# Patient Record
Sex: FEMALE | Race: BLACK OR AFRICAN AMERICAN | Employment: OTHER | URBAN - METROPOLITAN AREA
[De-identification: names, ages, dates, MRNs, and addresses within clinical notes are randomized per-mention and may not be internally consistent; named-entity substitution may affect disease eponyms.]

---

## 2017-02-02 ENCOUNTER — HOSPITAL ENCOUNTER (OUTPATIENT)
Dept: CT IMAGING | Facility: HOSPITAL | Age: 62
Discharge: HOME | End: 2017-02-02
Attending: INTERNAL MEDICINE
Payer: MEDICAID

## 2017-02-02 DIAGNOSIS — R91.1 PULMONARY NODULE: ICD-10-CM

## 2017-02-02 PROCEDURE — 71250 CT THORAX DX C-: CPT

## 2017-02-14 ENCOUNTER — TELEPHONE (OUTPATIENT)
Dept: INTERNAL MEDICINE CLINIC | Facility: CLINIC | Age: 62
End: 2017-02-14

## 2017-02-14 DIAGNOSIS — M1A.00X0 IDIOPATHIC CHRONIC GOUT WITHOUT TOPHUS, UNSPECIFIED SITE: Primary | ICD-10-CM

## 2017-02-14 RX ORDER — COLCHICINE 0.6 MG/1
0.6 TABLET ORAL DAILY
Qty: 30 TABLET | Refills: 5 | Status: SHIPPED | OUTPATIENT
Start: 2017-02-14 | End: 2019-08-19

## 2017-02-14 NOTE — TELEPHONE ENCOUNTER
Pt called to inform L foot gout flare up. Pt stated does not want to take Prednisone again as had weight gain. Last time pt was seen for this Dx was on 11/21/16. Pt inquiring if there is a medication that can be taken chronically to prevent flare ups.  Anshu

## 2017-02-15 ENCOUNTER — TELEPHONE (OUTPATIENT)
Dept: INTERNAL MEDICINE CLINIC | Facility: CLINIC | Age: 62
End: 2017-02-15

## 2017-02-15 DIAGNOSIS — M1A.00X0 IDIOPATHIC CHRONIC GOUT WITHOUT TOPHUS, UNSPECIFIED SITE: Primary | ICD-10-CM

## 2017-02-15 NOTE — TELEPHONE ENCOUNTER
Yes, she can take colchicine for daily prevention of gout. I've sent a script to her pharmacy. Alessia Hernández. Isabel Nino MD  Diplomate, American Board of Internal Medicine  [facility]  [address] [facility],[address], [address], [facility], [address] [place] [address]  T: [phone]

## 2017-02-15 NOTE — TELEPHONE ENCOUNTER
Pt called to inform insurance will cover only 6 tabs per month of Colchicine. Pt inquiring if only take as needed? Or can get a different prescription to take daily. Please advise.

## 2017-02-16 RX ORDER — ALLOPURINOL 100 MG/1
100 TABLET ORAL DAILY
Qty: 30 TABLET | Refills: 5 | Status: SHIPPED | OUTPATIENT
Start: 2017-02-16 | End: 2017-04-26 | Stop reason: ALTCHOICE

## 2017-02-17 NOTE — TELEPHONE ENCOUNTER
We will do a change in the daily prevention medication. Script sent for Allopurinol 100 mg qAM.  For the colchicine, she can take it up to BID PRN for an acute gout flare. Rupinder Martines.  Severo Obey, MD  Diplomate, American Board of Internal Medicine  [facility]

## 2017-04-26 ENCOUNTER — HOSPITAL ENCOUNTER (OUTPATIENT)
Dept: GENERAL RADIOLOGY | Age: 62
Discharge: HOME | End: 2017-04-26
Attending: INTERNAL MEDICINE
Payer: MEDICAID

## 2017-04-26 ENCOUNTER — OFFICE VISIT (OUTPATIENT)
Dept: INTERNAL MEDICINE CLINIC | Facility: CLINIC | Age: 62
End: 2017-04-26

## 2017-04-26 VITALS
SYSTOLIC BLOOD PRESSURE: 106 MMHG | DIASTOLIC BLOOD PRESSURE: 82 MMHG | RESPIRATION RATE: 22 BRPM | HEART RATE: 88 BPM | OXYGEN SATURATION: 98 % | TEMPERATURE: 98 F | WEIGHT: 157 LBS | HEIGHT: 63.75 IN | BODY MASS INDEX: 27.13 KG/M2

## 2017-04-26 DIAGNOSIS — L98.9 BENIGN SKIN LESION OF THIGH: ICD-10-CM

## 2017-04-26 DIAGNOSIS — R91.1 PULMONARY NODULE: ICD-10-CM

## 2017-04-26 DIAGNOSIS — M79.674 TOE PAIN, RIGHT: Primary | ICD-10-CM

## 2017-04-26 DIAGNOSIS — M79.674 TOE PAIN, RIGHT: ICD-10-CM

## 2017-04-26 DIAGNOSIS — M1A.00X0 IDIOPATHIC CHRONIC GOUT WITHOUT TOPHUS, UNSPECIFIED SITE: ICD-10-CM

## 2017-04-26 DIAGNOSIS — R73.03 PREDIABETES: ICD-10-CM

## 2017-04-26 PROCEDURE — 99214 OFFICE O/P EST MOD 30 MIN: CPT | Performed by: INTERNAL MEDICINE

## 2017-04-26 PROCEDURE — 73630 X-RAY EXAM OF FOOT: CPT

## 2017-04-26 RX ORDER — INFLUENZA A VIRUS A/CHRISTCHURCH/16/2010 NIB-74 (H1N1) HEMAGGLUTININ ANTIGEN (PROPIOLACTONE INACTIVATED), INFLUENZA A VIRUS A/SWITZERLAND/9715293/2013, NIB-88 (H3N2) HEMAGGLUTININ ANTIGEN (PROPIOLACTONE INACTIVATED), INFLUENZA B VIRUS B/PHUKET/3073/2013 - WILD TYPE HEMAGGLUTININ ANTIGEN (PROPIOLACTONE INACTIVATED) 15; 15; 15 UG/.5ML; UG/.5ML; UG/.5ML
INJECTION, SUSPENSION INTRAMUSCULAR
Refills: 0 | COMMUNITY
Start: 2017-02-07 | End: 2017-04-26 | Stop reason: ALTCHOICE

## 2017-04-26 NOTE — PROGRESS NOTES
Kaley Bonilla is a 59year old female.      HPI:   Patient presents with:  Bump: inner thigh, right thigh, cyst?, feels something hard underneath, irritation when thighs rubbing   Foot Injury: x2 months, bumped right foot pinky toe, still painful   Patien a past medical history of Herniated disc and Calculus of kidney. Surgical:  has past surgical history that includes leg/ankle surgery proc unlisted (04); anesth, section (40,28,88); and excis lumbar disk,one level (2015).   Family: family his now.    4.  Prediabetes  Lifestyle controlled. A1c of 5.8 in November. Repeat later in year. 5.  Lung nodule  Following with Pulmonology (Dr. Shy Styles). Some changing lesions on CT from February, suggestive of inflammatory/infectious changes.       Tiera

## 2017-04-26 NOTE — PATIENT INSTRUCTIONS
- For your skin bump, follow up surgery. Dr. Gayle Whitaker is here on Tuesdays.  - For your toe, we will check an x-ray today. It was a pleasure seeing you in the clinic today.   Thank you for choosing the [facility] office for your hea

## 2017-04-28 ENCOUNTER — TELEPHONE (OUTPATIENT)
Dept: INTERNAL MEDICINE CLINIC | Facility: CLINIC | Age: 62
End: 2017-04-28

## 2017-04-28 DIAGNOSIS — S92.504A CLOSED NONDISPLACED FRACTURE OF PHALANX OF LESSER TOE OF RIGHT FOOT, UNSPECIFIED PHALANX, INITIAL ENCOUNTER: Primary | ICD-10-CM

## 2017-04-28 NOTE — TELEPHONE ENCOUNTER
Pt called 's office they require a referral before setting an apt.  Once its in we can fax to [phone]

## 2017-05-02 ENCOUNTER — OFFICE VISIT (OUTPATIENT)
Dept: SURGERY | Facility: CLINIC | Age: 62
End: 2017-05-02

## 2017-05-02 ENCOUNTER — TELEPHONE (OUTPATIENT)
Dept: SURGERY | Facility: CLINIC | Age: 62
End: 2017-05-02

## 2017-05-02 VITALS — RESPIRATION RATE: 18 BRPM

## 2017-05-02 DIAGNOSIS — R22.9 SINGLE SKIN NODULE: Primary | ICD-10-CM

## 2017-05-02 DIAGNOSIS — L98.9 SKIN LESION: Primary | ICD-10-CM

## 2017-05-02 PROCEDURE — 99243 OFF/OP CNSLTJ NEW/EST LOW 30: CPT | Performed by: SURGERY

## 2017-05-02 NOTE — H&P
New Patient Visit Note       Active Problems      1. Single skin nodule        Chief Complaint   Patient presents with:  Lesion: New patient- consultation for skin leson on right inner thigh.  states at times it flares up. when it gets inflammed can not brett Packs/Day: 0.00  Years:           Quit date: 01/04/1985    Alcohol Use: Yes           0.5 oz/week       1 Glasses of wine per week       Comment: 1 drink per month     Drug Use: No              Sexual Activity: No                   Other Topics well-nourished. No distress. HENT:   Head: Normocephalic and atraumatic. Eyes: Conjunctivae are normal. No scleral icterus. Neck: Normal range of motion. No JVD present. No tracheal deviation present.    Cardiovascular: Normal rate, regular rhythm, S1 speech is normal and behavior is normal. Judgment and thought content normal.   Nursing note and vitals reviewed.           Assessment   Single skin nodule  (primary encounter diagnosis)      Plan     · The patient will be scheduled for excision of her righ

## 2017-06-13 ENCOUNTER — TELEPHONE (OUTPATIENT)
Dept: SURGERY | Facility: CLINIC | Age: 62
End: 2017-06-13

## 2017-06-13 DIAGNOSIS — L98.9 SKIN LESION: Primary | ICD-10-CM

## 2017-08-18 ENCOUNTER — TELEPHONE (OUTPATIENT)
Dept: INTERNAL MEDICINE CLINIC | Facility: CLINIC | Age: 62
End: 2017-08-18

## 2017-08-18 DIAGNOSIS — Z12.31 ENCOUNTER FOR SCREENING MAMMOGRAM FOR BREAST CANCER: Primary | ICD-10-CM

## 2017-08-18 NOTE — TELEPHONE ENCOUNTER
Patient needs an order for a mammogram in the system.  Patient was told to wait a couple days before scheduling

## 2017-08-18 NOTE — TELEPHONE ENCOUNTER
Mammo ordered, notify pt. Rema Parker. Maegan Jaquez MD  Diplomate, American Board of Internal Medicine  [facility]  [address] [facility],[address], [address], [facility], [address] [place] [address]  T: [phone]; F: Franklyn 5

## 2017-08-29 DIAGNOSIS — M1A.00X0 IDIOPATHIC CHRONIC GOUT WITHOUT TOPHUS, UNSPECIFIED SITE: ICD-10-CM

## 2017-08-31 RX ORDER — ALLOPURINOL 100 MG/1
TABLET ORAL
Qty: 30 TABLET | Refills: 5 | Status: SHIPPED | OUTPATIENT
Start: 2017-08-31 | End: 2017-09-05

## 2017-09-05 ENCOUNTER — OFFICE VISIT (OUTPATIENT)
Dept: INTERNAL MEDICINE CLINIC | Facility: CLINIC | Age: 62
End: 2017-09-05

## 2017-09-05 ENCOUNTER — LAB ENCOUNTER (OUTPATIENT)
Dept: LAB | Age: 62
End: 2017-09-05
Attending: INTERNAL MEDICINE
Payer: COMMERCIAL

## 2017-09-05 VITALS
RESPIRATION RATE: 18 BRPM | HEART RATE: 78 BPM | WEIGHT: 158.25 LBS | BODY MASS INDEX: 26.69 KG/M2 | HEIGHT: 64.5 IN | SYSTOLIC BLOOD PRESSURE: 110 MMHG | DIASTOLIC BLOOD PRESSURE: 66 MMHG | TEMPERATURE: 98 F

## 2017-09-05 DIAGNOSIS — R73.03 PREDIABETES: ICD-10-CM

## 2017-09-05 DIAGNOSIS — F51.01 PRIMARY INSOMNIA: ICD-10-CM

## 2017-09-05 DIAGNOSIS — R73.03 PREDIABETES: Primary | ICD-10-CM

## 2017-09-05 DIAGNOSIS — M1A.00X0 IDIOPATHIC CHRONIC GOUT WITHOUT TOPHUS, UNSPECIFIED SITE: ICD-10-CM

## 2017-09-05 PROBLEM — R22.9 SINGLE SKIN NODULE: Status: RESOLVED | Noted: 2017-05-02 | Resolved: 2017-09-05

## 2017-09-05 LAB
ALBUMIN SERPL-MCNC: 3.7 G/DL (ref 3.5–4.8)
ALP LIVER SERPL-CCNC: 77 U/L (ref 50–130)
ALT SERPL-CCNC: 18 U/L (ref 14–54)
AST SERPL-CCNC: 16 U/L (ref 15–41)
BASOPHILS # BLD AUTO: 0.01 X10(3) UL (ref 0–0.1)
BASOPHILS NFR BLD AUTO: 0.3 %
BILIRUB SERPL-MCNC: 0.2 MG/DL (ref 0.1–2)
BUN BLD-MCNC: 9 MG/DL (ref 8–20)
CALCIUM BLD-MCNC: 9.1 MG/DL (ref 8.3–10.3)
CHLORIDE: 109 MMOL/L (ref 101–111)
CHOLEST SMN-MCNC: 195 MG/DL (ref ?–200)
CO2: 25 MMOL/L (ref 22–32)
CREAT BLD-MCNC: 0.65 MG/DL (ref 0.55–1.02)
EOSINOPHIL # BLD AUTO: 0.03 X10(3) UL (ref 0–0.3)
EOSINOPHIL NFR BLD AUTO: 1 %
ERYTHROCYTE [DISTWIDTH] IN BLOOD BY AUTOMATED COUNT: 12.8 % (ref 11.5–16)
EST. AVERAGE GLUCOSE BLD GHB EST-MCNC: 128 MG/DL (ref 68–126)
GLUCOSE BLD-MCNC: 77 MG/DL (ref 70–99)
HBA1C MFR BLD HPLC: 6.1 % (ref ?–5.7)
HCT VFR BLD AUTO: 40.9 % (ref 34–50)
HDLC SERPL-MCNC: 75 MG/DL (ref 45–?)
HDLC SERPL: 2.6 {RATIO} (ref ?–4.44)
HGB BLD-MCNC: 13.2 G/DL (ref 12–16)
IMMATURE GRANULOCYTE COUNT: 0.01 X10(3) UL (ref 0–1)
IMMATURE GRANULOCYTE RATIO %: 0.3 %
LDLC SERPL CALC-MCNC: 92 MG/DL (ref ?–130)
LDLC SERPL-MCNC: 28 MG/DL (ref 5–40)
LYMPHOCYTES # BLD AUTO: 1.5 X10(3) UL (ref 0.9–4)
LYMPHOCYTES NFR BLD AUTO: 49.2 %
M PROTEIN MFR SERPL ELPH: 7.5 G/DL (ref 6.1–8.3)
MCH RBC QN AUTO: 31.9 PG (ref 27–33.2)
MCHC RBC AUTO-ENTMCNC: 32.3 G/DL (ref 31–37)
MCV RBC AUTO: 98.8 FL (ref 81–100)
MONOCYTES # BLD AUTO: 0.37 X10(3) UL (ref 0.1–0.6)
MONOCYTES NFR BLD AUTO: 12.1 %
NEUTROPHIL ABS PRELIM: 1.13 X10 (3) UL (ref 1.3–6.7)
NEUTROPHILS # BLD AUTO: 1.13 X10(3) UL (ref 1.3–6.7)
NEUTROPHILS NFR BLD AUTO: 37.1 %
NONHDLC SERPL-MCNC: 120 MG/DL (ref ?–130)
PLATELET # BLD AUTO: 213 10(3)UL (ref 150–450)
POTASSIUM SERPL-SCNC: 4.1 MMOL/L (ref 3.6–5.1)
RBC # BLD AUTO: 4.14 X10(6)UL (ref 3.8–5.1)
RED CELL DISTRIBUTION WIDTH-SD: 46.2 FL (ref 35.1–46.3)
SODIUM SERPL-SCNC: 141 MMOL/L (ref 136–144)
TRIGLYCERIDES: 139 MG/DL (ref ?–150)
URIC ACID: 4 MG/DL (ref 2.4–8)
WBC # BLD AUTO: 3.1 X10(3) UL (ref 4–13)

## 2017-09-05 PROCEDURE — 80053 COMPREHEN METABOLIC PANEL: CPT | Performed by: INTERNAL MEDICINE

## 2017-09-05 PROCEDURE — 83036 HEMOGLOBIN GLYCOSYLATED A1C: CPT | Performed by: INTERNAL MEDICINE

## 2017-09-05 PROCEDURE — 36415 COLL VENOUS BLD VENIPUNCTURE: CPT | Performed by: INTERNAL MEDICINE

## 2017-09-05 PROCEDURE — 84550 ASSAY OF BLOOD/URIC ACID: CPT | Performed by: INTERNAL MEDICINE

## 2017-09-05 PROCEDURE — 80061 LIPID PANEL: CPT | Performed by: INTERNAL MEDICINE

## 2017-09-05 PROCEDURE — 85025 COMPLETE CBC W/AUTO DIFF WBC: CPT | Performed by: INTERNAL MEDICINE

## 2017-09-05 PROCEDURE — 99214 OFFICE O/P EST MOD 30 MIN: CPT | Performed by: INTERNAL MEDICINE

## 2017-09-05 RX ORDER — TRAZODONE HYDROCHLORIDE 50 MG/1
50 TABLET ORAL NIGHTLY PRN
Qty: 30 TABLET | Refills: 2 | Status: SHIPPED | OUTPATIENT
Start: 2017-09-05 | End: 2018-03-15

## 2017-09-05 RX ORDER — KETOCONAZOLE 20 MG/G
CREAM TOPICAL
Refills: 3 | COMMUNITY
Start: 2017-08-01 | End: 2018-03-29

## 2017-09-05 NOTE — PROGRESS NOTES
Patient presents with: Other: prediabetes  Gout  Sleep Problem      HPI: The pt presents today for eval of 3 issues:  1. Prediabetes - She's due for updated metrics and disease burden reassessment. She's been working on diet/exercise.   2. Gout - Stable o Years:  6.00         Quit date: 1/4/1985  Smokeless tobacco: Never Used                      Alcohol use: Yes           0.0 - 0.6 oz/week     Glasses of wine: 0 - 1 per week     Comment: 1 drink per month       PE:  /66 (BP Location: Left arm, Tiera

## 2017-09-19 ENCOUNTER — HOSPITAL ENCOUNTER (OUTPATIENT)
Dept: MAMMOGRAPHY | Age: 62
Discharge: HOME | End: 2017-09-19
Attending: INTERNAL MEDICINE
Payer: COMMERCIAL

## 2017-09-19 DIAGNOSIS — Z12.31 ENCOUNTER FOR SCREENING MAMMOGRAM FOR BREAST CANCER: ICD-10-CM

## 2017-09-19 PROCEDURE — 77067 SCR MAMMO BI INCL CAD: CPT | Performed by: INTERNAL MEDICINE

## 2017-09-19 PROCEDURE — 77063 BREAST TOMOSYNTHESIS BI: CPT | Performed by: INTERNAL MEDICINE

## 2017-12-31 ENCOUNTER — HOSPITAL ENCOUNTER (EMERGENCY)
Facility: HOSPITAL | Age: 62
Discharge: HOME | End: 2017-12-31
Attending: EMERGENCY MEDICINE
Payer: COMMERCIAL

## 2017-12-31 ENCOUNTER — APPOINTMENT (OUTPATIENT)
Dept: GENERAL RADIOLOGY | Facility: HOSPITAL | Age: 62
End: 2017-12-31
Attending: EMERGENCY MEDICINE
Payer: COMMERCIAL

## 2017-12-31 VITALS
RESPIRATION RATE: 19 BRPM | TEMPERATURE: 99 F | BODY MASS INDEX: 26.46 KG/M2 | HEIGHT: 64 IN | SYSTOLIC BLOOD PRESSURE: 127 MMHG | HEART RATE: 102 BPM | DIASTOLIC BLOOD PRESSURE: 77 MMHG | WEIGHT: 155 LBS | OXYGEN SATURATION: 97 %

## 2017-12-31 DIAGNOSIS — R09.1 PLEURISY: ICD-10-CM

## 2017-12-31 DIAGNOSIS — J20.9 ACUTE BRONCHITIS, UNSPECIFIED ORGANISM: Primary | ICD-10-CM

## 2017-12-31 LAB
ALBUMIN SERPL-MCNC: 3.5 G/DL (ref 3.5–4.8)
ALP LIVER SERPL-CCNC: 98 U/L (ref 50–130)
ALT SERPL-CCNC: 46 U/L (ref 14–54)
AST SERPL-CCNC: 51 U/L (ref 15–41)
BASOPHILS # BLD AUTO: 0.02 X10(3) UL (ref 0–0.1)
BASOPHILS NFR BLD AUTO: 0.4 %
BILIRUB SERPL-MCNC: 0.3 MG/DL (ref 0.1–2)
BUN BLD-MCNC: 8 MG/DL (ref 8–20)
CALCIUM BLD-MCNC: 8.9 MG/DL (ref 8.3–10.3)
CHLORIDE: 105 MMOL/L (ref 101–111)
CO2: 23 MMOL/L (ref 22–32)
CREAT BLD-MCNC: 0.75 MG/DL (ref 0.55–1.02)
D-DIMER: 0.39 UG/ML FEU (ref 0–0.49)
EOSINOPHIL # BLD AUTO: 0.06 X10(3) UL (ref 0–0.3)
EOSINOPHIL NFR BLD AUTO: 1.3 %
ERYTHROCYTE [DISTWIDTH] IN BLOOD BY AUTOMATED COUNT: 12.8 % (ref 11.5–16)
GLUCOSE BLD-MCNC: 93 MG/DL (ref 70–99)
HCT VFR BLD AUTO: 37 % (ref 34–50)
HGB BLD-MCNC: 12.6 G/DL (ref 12–16)
IMMATURE GRANULOCYTE COUNT: 0.02 X10(3) UL (ref 0–1)
IMMATURE GRANULOCYTE RATIO %: 0.4 %
LYMPHOCYTES # BLD AUTO: 0.9 X10(3) UL (ref 0.9–4)
LYMPHOCYTES NFR BLD AUTO: 20 %
M PROTEIN MFR SERPL ELPH: 7.6 G/DL (ref 6.1–8.3)
MCH RBC QN AUTO: 31.8 PG (ref 27–33.2)
MCHC RBC AUTO-ENTMCNC: 34.1 G/DL (ref 31–37)
MCV RBC AUTO: 93.4 FL (ref 81–100)
MONOCYTES # BLD AUTO: 0.53 X10(3) UL (ref 0.1–0.6)
MONOCYTES NFR BLD AUTO: 11.8 %
NEUTROPHIL ABS PRELIM: 2.96 X10 (3) UL (ref 1.3–6.7)
NEUTROPHILS # BLD AUTO: 2.96 X10(3) UL (ref 1.3–6.7)
NEUTROPHILS NFR BLD AUTO: 66.1 %
PLATELET # BLD AUTO: 257 10(3)UL (ref 150–450)
POTASSIUM SERPL-SCNC: 4.2 MMOL/L (ref 3.6–5.1)
RBC # BLD AUTO: 3.96 X10(6)UL (ref 3.8–5.1)
RED CELL DISTRIBUTION WIDTH-SD: 43.9 FL (ref 35.1–46.3)
SODIUM SERPL-SCNC: 137 MMOL/L (ref 136–144)
TROPONIN: <0.046 NG/ML (ref ?–0.05)
WBC # BLD AUTO: 4.5 X10(3) UL (ref 4–13)

## 2017-12-31 PROCEDURE — 71010 XR CHEST AP PORTABLE  (CPT=71010): CPT | Performed by: EMERGENCY MEDICINE

## 2017-12-31 PROCEDURE — 85025 COMPLETE CBC W/AUTO DIFF WBC: CPT | Performed by: EMERGENCY MEDICINE

## 2017-12-31 PROCEDURE — 85378 FIBRIN DEGRADE SEMIQUANT: CPT | Performed by: EMERGENCY MEDICINE

## 2017-12-31 PROCEDURE — 93005 ELECTROCARDIOGRAM TRACING: CPT

## 2017-12-31 PROCEDURE — 93010 ELECTROCARDIOGRAM REPORT: CPT

## 2017-12-31 PROCEDURE — 99285 EMERGENCY DEPT VISIT HI MDM: CPT

## 2017-12-31 PROCEDURE — 84484 ASSAY OF TROPONIN QUANT: CPT | Performed by: EMERGENCY MEDICINE

## 2017-12-31 PROCEDURE — 80053 COMPREHEN METABOLIC PANEL: CPT | Performed by: EMERGENCY MEDICINE

## 2017-12-31 PROCEDURE — 96374 THER/PROPH/DIAG INJ IV PUSH: CPT

## 2017-12-31 RX ORDER — KETOROLAC TROMETHAMINE 30 MG/ML
15 INJECTION, SOLUTION INTRAMUSCULAR; INTRAVENOUS ONCE
Status: COMPLETED | OUTPATIENT
Start: 2017-12-31 | End: 2017-12-31

## 2017-12-31 RX ORDER — ASPIRIN 81 MG/1
324 TABLET, CHEWABLE ORAL ONCE
Status: COMPLETED | OUTPATIENT
Start: 2017-12-31 | End: 2017-12-31

## 2017-12-31 RX ORDER — CODEINE PHOSPHATE AND GUAIFENESIN 10; 100 MG/5ML; MG/5ML
5 SOLUTION ORAL EVERY 6 HOURS PRN
Qty: 120 ML | Refills: 0 | Status: SHIPPED | OUTPATIENT
Start: 2017-12-31 | End: 2018-03-15 | Stop reason: ALTCHOICE

## 2017-12-31 RX ORDER — AZITHROMYCIN 250 MG/1
TABLET, FILM COATED ORAL
Qty: 1 PACKAGE | Refills: 0 | Status: SHIPPED | OUTPATIENT
Start: 2017-12-31 | End: 2018-03-15 | Stop reason: ALTCHOICE

## 2017-12-31 NOTE — ED INITIAL ASSESSMENT (HPI)
Chest pain since last night, more severe during the night. Pain started last night in lower mid chest, over night the pain has moved up to upper mid chest.  Pt currently has cold symptoms and has a cough. Pt does have some shortness of breath.   Pt also h

## 2017-12-31 NOTE — ED PROVIDER NOTES
Patient Seen in: [facility] Emergency Department    History   Patient presents with:  Chest Pain Angina (cardiovascular)  Cough/URI    Stated Complaint:     HPI    49-year-old female complaining of chest pain this patient states she has had some col JVD.  Lungs are clear to auscultation. Cardiovascular exam regular rate and rhythm without murmurs. Chest wall is moderately tender left anterior chest.  Abdomen is soft and nontender without rebound or guarding.   Extremities no clubbing cyanosis or carl Robitussin-AC advised use anti-inflammatory medicine I believe she has a bronchitis with pleurisy advised return any problems.             Disposition and Plan     Clinical Impression:  Acute bronchitis, unspecified organism  (primary encounter diagnosis)

## 2017-12-31 NOTE — ED NOTES
Discharge paperwork reviewed with pt, copy given. Pt given prescriptions for azithromycin and guaifenesin-codeine, education provided. No questions at time of discharge.

## 2018-01-01 LAB
ATRIAL RATE: 103 BPM
P AXIS: 59 DEGREES
P-R INTERVAL: 162 MS
Q-T INTERVAL: 342 MS
QRS DURATION: 76 MS
QTC CALCULATION (BEZET): 448 MS
R AXIS: 18 DEGREES
T AXIS: 44 DEGREES
VENTRICULAR RATE: 103 BPM

## 2018-03-15 ENCOUNTER — OFFICE VISIT (OUTPATIENT)
Dept: INTERNAL MEDICINE CLINIC | Facility: CLINIC | Age: 63
End: 2018-03-15

## 2018-03-15 ENCOUNTER — APPOINTMENT (OUTPATIENT)
Dept: LAB | Age: 63
End: 2018-03-15
Attending: INTERNAL MEDICINE
Payer: MEDICAID

## 2018-03-15 VITALS
DIASTOLIC BLOOD PRESSURE: 70 MMHG | HEIGHT: 64 IN | OXYGEN SATURATION: 97 % | BODY MASS INDEX: 26.46 KG/M2 | SYSTOLIC BLOOD PRESSURE: 112 MMHG | WEIGHT: 155 LBS | TEMPERATURE: 98 F | HEART RATE: 91 BPM

## 2018-03-15 DIAGNOSIS — F51.04 CHRONIC INSOMNIA: ICD-10-CM

## 2018-03-15 DIAGNOSIS — R91.1 PULMONARY NODULE: ICD-10-CM

## 2018-03-15 DIAGNOSIS — R73.03 PREDIABETES: ICD-10-CM

## 2018-03-15 DIAGNOSIS — R73.03 PREDIABETES: Primary | ICD-10-CM

## 2018-03-15 LAB
EST. AVERAGE GLUCOSE BLD GHB EST-MCNC: 120 MG/DL (ref 68–126)
HBA1C MFR BLD HPLC: 5.8 % (ref ?–5.7)

## 2018-03-15 PROCEDURE — 83036 HEMOGLOBIN GLYCOSYLATED A1C: CPT | Performed by: INTERNAL MEDICINE

## 2018-03-15 PROCEDURE — 36415 COLL VENOUS BLD VENIPUNCTURE: CPT | Performed by: INTERNAL MEDICINE

## 2018-03-15 PROCEDURE — 99214 OFFICE O/P EST MOD 30 MIN: CPT | Performed by: INTERNAL MEDICINE

## 2018-03-15 RX ORDER — ZOLPIDEM TARTRATE 5 MG/1
5 TABLET ORAL NIGHTLY PRN
Qty: 30 TABLET | Refills: 0 | Status: SHIPPED | OUTPATIENT
Start: 2018-03-15 | End: 2019-06-06 | Stop reason: ALTCHOICE

## 2018-03-15 NOTE — PROGRESS NOTES
Patient presents with:  Blood Sugar Problem: Prediabetes FU   Lung Problem: Follow up on CT Scan of chest from 2017 and visit Pulmonologist   Sleep Problem      HPI: The pt presents today for eval of 3 issues:  1. Prediabetes - Last A1c on 6.1%.   She's due Packs/day: 2.00      Years: 6.00         Quit date: 1/4/1985  Smokeless tobacco: Never Used                      Alcohol use: Yes           0.0 - 0.6 oz/week     Glasses of wine: 0 - 1 per week     Comment: 1 drink per month       PE:

## 2018-03-26 ENCOUNTER — HOSPITAL ENCOUNTER (OUTPATIENT)
Dept: CT IMAGING | Age: 63
Discharge: HOME | End: 2018-03-26
Attending: INTERNAL MEDICINE
Payer: MEDICAID

## 2018-03-26 DIAGNOSIS — R91.1 PULMONARY NODULE: ICD-10-CM

## 2018-03-26 PROCEDURE — 71250 CT THORAX DX C-: CPT | Performed by: INTERNAL MEDICINE

## 2018-03-27 ENCOUNTER — TELEPHONE (OUTPATIENT)
Dept: INTERNAL MEDICINE CLINIC | Facility: CLINIC | Age: 63
End: 2018-03-27

## 2018-03-27 NOTE — TELEPHONE ENCOUNTER
[facility]    -Front staff pt needs to schedule asap w Lucrecia Malone or Dr Fox Valdes. If pt needs to speak to a nurse get Karyle Stanley or Michelle. Thanks.

## 2018-03-27 NOTE — TELEPHONE ENCOUNTER
Pt has schedule an ov w Dr Girma Renee for 3/29/18. Dr Girma Renee please review CT, ok to wait until then? Please advise.

## 2018-03-27 NOTE — PROGRESS NOTES
Abnormal -:  New masslike lesion in the superior segment right lower lobe maximum dimension 2.9 cm.  This was not present on prior CT examination from almost 14 months ago, early February 2017.  Lung cancer must be the diagnosis of exclusion, in this   pat

## 2018-03-29 ENCOUNTER — OFFICE VISIT (OUTPATIENT)
Dept: INTERNAL MEDICINE CLINIC | Facility: CLINIC | Age: 63
End: 2018-03-29

## 2018-03-29 VITALS
DIASTOLIC BLOOD PRESSURE: 78 MMHG | BODY MASS INDEX: 24.8 KG/M2 | TEMPERATURE: 98 F | HEART RATE: 98 BPM | SYSTOLIC BLOOD PRESSURE: 114 MMHG | RESPIRATION RATE: 16 BRPM | OXYGEN SATURATION: 98 % | HEIGHT: 64 IN | WEIGHT: 145.25 LBS

## 2018-03-29 DIAGNOSIS — R91.8 ABNORMAL CT SCAN OF LUNG: Primary | ICD-10-CM

## 2018-03-29 PROCEDURE — 99215 OFFICE O/P EST HI 40 MIN: CPT | Performed by: INTERNAL MEDICINE

## 2018-03-29 RX ORDER — AZITHROMYCIN 250 MG/1
TABLET, FILM COATED ORAL
Qty: 6 TABLET | Refills: 0 | Status: SHIPPED | OUTPATIENT
Start: 2018-03-29 | End: 2019-06-06

## 2018-03-29 RX ORDER — KETOCONAZOLE 20 MG/G
1 CREAM TOPICAL DAILY PRN
COMMUNITY
End: 2019-08-19

## 2018-03-29 RX ORDER — CODEINE PHOSPHATE AND GUAIFENESIN 10; 100 MG/5ML; MG/5ML
5 SOLUTION ORAL NIGHTLY
Qty: 120 ML | Refills: 0 | Status: SHIPPED | OUTPATIENT
Start: 2018-03-29 | End: 2019-06-06 | Stop reason: ALTCHOICE

## 2018-03-29 NOTE — PROGRESS NOTES
Dolores Schmidt is a 57year old female. HPI:   Patient presents with:  Test Results: CT Scan   Other: Due for CPE  Patient presents to discuss recent CT scan results. Here with several family members.   CT was done for surveillance of chronic pulmonary Rfl:   •  azithromycin 250 MG Oral Tab, Take two tablets by mouth today, then one daily. , Disp: 6 tablet, Rfl: 0  •  guaiFENesin-codeine 100-10 MG/5ML Oral Solution, Take 5 mL by mouth nightly., Disp: 120 mL, Rfl: 0  •  Zolpidem Tartrate 5 MG Oral Tab, [facility] PERRLA, EOMI, normal lid and conjunctiva  LUNGS: clear to auscultation bilaterally, no wheezing/rubs  CARDIO: RRR without murmurs. No clubbing, cyanosis or edema.   GI: soft non tender nondistended no hepatosplenomegaly, bowel sounds throughout  PSYCH: ple Cheryl Gonzalez MD

## 2018-03-29 NOTE — PATIENT INSTRUCTIONS
- Follow up with Pulmonology. Doctors we use in that group are Dr. Irene Hurtado, Dr. Leeann Kohler (she saw you in the hospital), Dr. Stephany Atkinson. - We will get your CT scan from [address] [place] [address]. We will let you know when we get the results back.     - NANY noe

## 2018-08-24 ENCOUNTER — OCC HEALTH (OUTPATIENT)
Dept: OCCUPATIONAL MEDICINE | Age: 63
End: 2018-08-24
Attending: PHYSICIAN ASSISTANT

## 2018-08-27 ENCOUNTER — OFFICE VISIT (OUTPATIENT)
Dept: OCCUPATIONAL MEDICINE | Age: 63
End: 2018-08-27
Attending: PHYSICIAN ASSISTANT

## 2018-10-17 ENCOUNTER — OFFICE VISIT (OUTPATIENT)
Dept: OCCUPATIONAL MEDICINE | Age: 63
End: 2018-10-17
Attending: PHYSICIAN ASSISTANT

## 2018-10-19 ENCOUNTER — OFFICE VISIT (OUTPATIENT)
Dept: OCCUPATIONAL MEDICINE | Age: 63
End: 2018-10-19
Attending: PREVENTIVE MEDICINE

## 2018-10-25 ENCOUNTER — HOSPITAL (OUTPATIENT)
Dept: OTHER | Age: 63
End: 2018-10-25
Attending: EMERGENCY MEDICINE

## 2018-10-26 ENCOUNTER — OFFICE VISIT (OUTPATIENT)
Dept: OCCUPATIONAL MEDICINE | Age: 63
End: 2018-10-26
Attending: PHYSICIAN ASSISTANT

## 2018-12-07 ENCOUNTER — OFFICE VISIT (OUTPATIENT)
Dept: OCCUPATIONAL MEDICINE | Age: 63
End: 2018-12-07
Attending: PHYSICIAN ASSISTANT
Payer: OTHER MISCELLANEOUS

## 2018-12-07 DIAGNOSIS — L25.3 CONTACT DERMATITIS DUE TO CHEMICALS: Primary | ICD-10-CM

## 2019-06-06 ENCOUNTER — OFFICE VISIT (OUTPATIENT)
Dept: INTERNAL MEDICINE CLINIC | Facility: CLINIC | Age: 64
End: 2019-06-06
Payer: MEDICARE

## 2019-06-06 VITALS
HEIGHT: 63.75 IN | HEART RATE: 80 BPM | WEIGHT: 157.75 LBS | DIASTOLIC BLOOD PRESSURE: 70 MMHG | BODY MASS INDEX: 27.26 KG/M2 | SYSTOLIC BLOOD PRESSURE: 110 MMHG | RESPIRATION RATE: 16 BRPM | TEMPERATURE: 98 F

## 2019-06-06 DIAGNOSIS — R14.0 POSTPRANDIAL ABDOMINAL BLOATING: ICD-10-CM

## 2019-06-06 DIAGNOSIS — R91.1 PULMONARY NODULE: ICD-10-CM

## 2019-06-06 DIAGNOSIS — R73.03 PREDIABETES: Primary | ICD-10-CM

## 2019-06-06 DIAGNOSIS — Z12.31 ENCOUNTER FOR SCREENING MAMMOGRAM FOR MALIGNANT NEOPLASM OF BREAST: ICD-10-CM

## 2019-06-06 PROCEDURE — 99214 OFFICE O/P EST MOD 30 MIN: CPT | Performed by: INTERNAL MEDICINE

## 2019-06-06 NOTE — PROGRESS NOTES
Patient presents with: Follow - Up: prediabetes; pulmonary infiltrate history      HPI: Aisha Cramer presents today for f/u following issues:    1. Prediabetes - Due for updated labs. Working on lifestyle measures.   2. Pulmonary nodule - Last imaging was done BMI 27.29 kg/m²   Wt Readings from Last 6 Encounters:  06/06/19 : 157 lb 12 oz  03/29/18 : 145 lb 4 oz  03/15/18 : 155 lb  12/31/17 : 155 lb  09/05/17 : 158 lb 4 oz  04/26/17 : 157 lb  Gen - A&Ox3, NAD  Lungs - CTAB  CV - RRR, nl s1, s2  Abd - soft, NABS,

## 2019-06-07 ENCOUNTER — LAB ENCOUNTER (OUTPATIENT)
Dept: LAB | Age: 64
End: 2019-06-07
Attending: INTERNAL MEDICINE
Payer: MEDICARE

## 2019-06-07 DIAGNOSIS — R73.03 PREDIABETES: ICD-10-CM

## 2019-06-07 PROCEDURE — 83036 HEMOGLOBIN GLYCOSYLATED A1C: CPT

## 2019-06-07 PROCEDURE — 80053 COMPREHEN METABOLIC PANEL: CPT

## 2019-06-07 PROCEDURE — 80061 LIPID PANEL: CPT

## 2019-06-07 PROCEDURE — 85025 COMPLETE CBC W/AUTO DIFF WBC: CPT

## 2019-06-07 PROCEDURE — 36415 COLL VENOUS BLD VENIPUNCTURE: CPT

## 2019-06-20 ENCOUNTER — HOSPITAL ENCOUNTER (OUTPATIENT)
Dept: CT IMAGING | Age: 64
Discharge: HOME | End: 2019-06-20
Attending: INTERNAL MEDICINE
Payer: MEDICARE

## 2019-06-20 DIAGNOSIS — R91.8 PULMONARY INFILTRATE: ICD-10-CM

## 2019-06-20 PROCEDURE — 71250 CT THORAX DX C-: CPT | Performed by: INTERNAL MEDICINE

## 2019-09-13 ENCOUNTER — HOSPITAL ENCOUNTER (OUTPATIENT)
Dept: MAMMOGRAPHY | Age: 64
Discharge: HOME | End: 2019-09-13
Attending: INTERNAL MEDICINE
Payer: MEDICARE

## 2019-09-13 DIAGNOSIS — Z12.31 ENCOUNTER FOR SCREENING MAMMOGRAM FOR MALIGNANT NEOPLASM OF BREAST: ICD-10-CM

## 2019-09-13 PROCEDURE — 77067 SCR MAMMO BI INCL CAD: CPT | Performed by: INTERNAL MEDICINE

## 2019-09-13 PROCEDURE — 77063 BREAST TOMOSYNTHESIS BI: CPT | Performed by: INTERNAL MEDICINE

## 2019-12-20 PROBLEM — L90.0 LICHEN SCLEROSUS: Status: ACTIVE | Noted: 2019-12-20

## 2019-12-20 PROCEDURE — 88175 CYTOPATH C/V AUTO FLUID REDO: CPT | Performed by: OBSTETRICS & GYNECOLOGY

## 2019-12-20 PROCEDURE — 87624 HPV HI-RISK TYP POOLED RSLT: CPT | Performed by: OBSTETRICS & GYNECOLOGY

## 2020-03-30 ENCOUNTER — TELEPHONE (OUTPATIENT)
Dept: INTERNAL MEDICINE CLINIC | Facility: CLINIC | Age: 65
End: 2020-03-30

## 2020-03-30 NOTE — TELEPHONE ENCOUNTER
Pt had cold symptoms about 2 weeks ago which have subsided. Pt c/o mucous in throat and throat irritation d/t trying to bring up mucous. Pt states she can bring up a small amount of thick brownish sputum after drinking hot tea.   Pt states Mucinex has hel

## 2020-03-30 NOTE — TELEPHONE ENCOUNTER
Patient called complaining of cold symptoms x 2 weeks. Experiencing congestion and a lot of thick mucous. Denies any fever, chills or cough. Please advise. Patient gave verbal consent to virtual phone visit.  Patient understands and accepts financial re

## 2020-03-31 NOTE — TELEPHONE ENCOUNTER
Mucinex is Guaifenesin which is what we advise people to take as an expectorant. Most patients typically purchase Guaifenesin OTC.  She could try the pharmacy brand versus Mucinex brand itself which may save $$$. Alternatively, there are some liquids availa

## 2020-04-27 ENCOUNTER — TELEPHONE (OUTPATIENT)
Dept: INTERNAL MEDICINE CLINIC | Facility: CLINIC | Age: 65
End: 2020-04-27

## 2020-04-27 NOTE — TELEPHONE ENCOUNTER
Today no headache Last night very bad migraine ,felt like head was going to explode Took Es Tylenol and made it worse. Lasted all night log, Laying down made it worse so sitting up in chair most the night . if open eyes made it worse ,up to urinate 5-6 time

## 2020-04-27 NOTE — TELEPHONE ENCOUNTER
Patient experienced severe migraine last night and previously 5 days ago.  The headache she experienced last night she treated with Xtra strength tylenol last night 500mg  And it felt worse after taking without relief and could not open her eyes or drive to

## 2020-04-28 ENCOUNTER — VIRTUAL PHONE E/M (OUTPATIENT)
Dept: INTERNAL MEDICINE CLINIC | Facility: CLINIC | Age: 65
End: 2020-04-28
Payer: MEDICARE

## 2020-04-28 DIAGNOSIS — G43.009 MIGRAINE WITHOUT AURA AND WITHOUT STATUS MIGRAINOSUS, NOT INTRACTABLE: ICD-10-CM

## 2020-04-28 DIAGNOSIS — R73.03 PREDIABETES: Primary | ICD-10-CM

## 2020-04-28 PROCEDURE — 99442 PHONE E/M BY PHYS 11-20 MIN: CPT | Performed by: INTERNAL MEDICINE

## 2020-04-28 NOTE — PROGRESS NOTES
Virtual Telephone Check-In    Albino Brood verbally consents to a Virtual/Telephone Check-In visit on 04/28/20. Patient understands and accepts financial responsibility for any deductible, co-insurance and/or co-pays associated with this service. Take 1 tablet by mouth daily. , Disp: , Rfl:   •  Multiple Vitamins-Minerals (HM MULTIVITAMIN ADULT GUMMY OR), Take 1 tablet by mouth daily.   , Disp: , Rfl:     Social History    Tobacco Use      Smoking status: Former Smoker        Packs/day: 2.00

## 2020-04-29 ENCOUNTER — LAB ENCOUNTER (OUTPATIENT)
Dept: LAB | Age: 65
End: 2020-04-29
Attending: INTERNAL MEDICINE
Payer: MEDICARE

## 2020-04-29 DIAGNOSIS — R73.03 PREDIABETES: ICD-10-CM

## 2020-04-29 PROCEDURE — 80061 LIPID PANEL: CPT

## 2020-04-29 PROCEDURE — 80053 COMPREHEN METABOLIC PANEL: CPT

## 2020-04-29 PROCEDURE — 85025 COMPLETE CBC W/AUTO DIFF WBC: CPT

## 2020-04-29 PROCEDURE — 36415 COLL VENOUS BLD VENIPUNCTURE: CPT

## 2020-04-29 PROCEDURE — 83036 HEMOGLOBIN GLYCOSYLATED A1C: CPT

## 2020-08-27 ENCOUNTER — OFFICE VISIT (OUTPATIENT)
Dept: INTERNAL MEDICINE CLINIC | Facility: CLINIC | Age: 65
End: 2020-08-27
Payer: MEDICARE

## 2020-08-27 VITALS
WEIGHT: 172 LBS | HEART RATE: 84 BPM | DIASTOLIC BLOOD PRESSURE: 80 MMHG | BODY MASS INDEX: 29.01 KG/M2 | HEIGHT: 64.5 IN | SYSTOLIC BLOOD PRESSURE: 108 MMHG | RESPIRATION RATE: 16 BRPM | TEMPERATURE: 99 F

## 2020-08-27 DIAGNOSIS — Z12.31 SCREENING MAMMOGRAM, ENCOUNTER FOR: ICD-10-CM

## 2020-08-27 DIAGNOSIS — F51.04 CHRONIC INSOMNIA: ICD-10-CM

## 2020-08-27 DIAGNOSIS — E78.00 HYPERCHOLESTEROLEMIA: ICD-10-CM

## 2020-08-27 DIAGNOSIS — R73.03 PREDIABETES: ICD-10-CM

## 2020-08-27 DIAGNOSIS — Z00.00 ENCOUNTER FOR ANNUAL HEALTH EXAMINATION: Primary | ICD-10-CM

## 2020-08-27 DIAGNOSIS — L90.0 LICHEN SCLEROSUS: ICD-10-CM

## 2020-08-27 DIAGNOSIS — Z23 NEED FOR PROPHYLACTIC VACCINATION AGAINST STREPTOCOCCUS PNEUMONIAE (PNEUMOCOCCUS): ICD-10-CM

## 2020-08-27 PROBLEM — M1A.00X0 IDIOPATHIC CHRONIC GOUT WITHOUT TOPHUS: Status: RESOLVED | Noted: 2017-02-14 | Resolved: 2020-08-27

## 2020-08-27 PROCEDURE — 3008F BODY MASS INDEX DOCD: CPT | Performed by: INTERNAL MEDICINE

## 2020-08-27 PROCEDURE — 3079F DIAST BP 80-89 MM HG: CPT | Performed by: INTERNAL MEDICINE

## 2020-08-27 PROCEDURE — 99397 PER PM REEVAL EST PAT 65+ YR: CPT | Performed by: INTERNAL MEDICINE

## 2020-08-27 PROCEDURE — 96160 PT-FOCUSED HLTH RISK ASSMT: CPT | Performed by: INTERNAL MEDICINE

## 2020-08-27 PROCEDURE — G0438 PPPS, INITIAL VISIT: HCPCS | Performed by: INTERNAL MEDICINE

## 2020-08-27 PROCEDURE — 3074F SYST BP LT 130 MM HG: CPT | Performed by: INTERNAL MEDICINE

## 2020-08-27 PROCEDURE — 90670 PCV13 VACCINE IM: CPT | Performed by: INTERNAL MEDICINE

## 2020-08-27 PROCEDURE — G0009 ADMIN PNEUMOCOCCAL VACCINE: HCPCS | Performed by: INTERNAL MEDICINE

## 2020-08-27 NOTE — PROGRESS NOTES
Patient presents with: Well Adult: MAS       HPI:   Riya Lagunas is a 75year old female who presents for a MA (Medicare Advantage) [facility] (Once per calendar year) and annual f/u chronic medical conditions as follows:    1.  Prediabetes - Stable on status: Former Smoker        Packs/day: 2.00        Years: 6.00        Pack years: 15        Quit date: 1985        Years since quittin.6      Smokeless tobacco: Never Used         CAGE Alcohol screening   Mac Capps was screened for Alcohol MULTIVITAMIN ADULT GUMMY OR), Take 1 tablet by mouth daily.          MEDICAL INFORMATION:   She  has a past medical history of Blood in the stool, Calculus of kidney, Gout, Herniated disc, High cholesterol, Night sweats, Pneumonia due to organism (2013), an Head:  Normocephalic, without obvious abnormality, atraumatic   Eyes:  PERRL, conjunctiva/corneas clear, EOM's intact both eyes   Ears:  Normal TM's and external ear canals, both ears   Nose: Nares normal, septum midline,mucosa normal, no drainage or sin sclerosus    Chronic insomnia    Screening mammogram, encounter for  -     [facility] ARACELIS 2D+3D SCREENING BILAT (CPT=77067/91350);  Future    Need for prophylactic vaccination against Streptococcus pneumoniae (pneumococcus)    Other orders  -     PNEUMOCOCCAL VAC LDL-CHOLESTEROL (mg/dL (calc))   Date Value   09/07/2011 87        EKG - w/ Initial Preventative Physical Exam only, or if medically necessary Electrocardiogram date12/31/2017       Colorectal Cancer Screening      Colonoscopy Screen every 10 years Col abusers     Tetanus Toxoid  Only covered with a cut with metal- TD and TDaP Not covered by Medicare Part B No vaccine history found This may be covered with your prescription benefits, but Medicare does not cover unless Medically needed    Zoster  Not cove

## 2020-08-28 NOTE — PATIENT INSTRUCTIONS
Calvin Parikh's SCREENING SCHEDULE   Tests on this list are recommended by your physician but may not be covered, or covered at this frequency, by your insurer. Please check with your insurance carrier before scheduling to verify coverage.    PREVENTATI the following criteria:   • Men who are 73-68 years old and have smoked more than 100 cigarettes in their lifetime   • Anyone with a family history    Colorectal Cancer Screening  Covered up to Age 76     Colonoscopy Screen   Covered every 10 years- more o orders found for this or any previous visit.  Please get every year    Pneumococcal 13 (Prevnar)  Covered Once after 65 Orders placed or performed in visit on 08/27/20   • PNEUMOCOCCAL VACC, 13 ANGELIKA IM    Please get once after your 65th birthday    Brendan Enriquez

## 2020-11-06 ENCOUNTER — LABORATORY ENCOUNTER (OUTPATIENT)
Dept: LAB | Age: 65
End: 2020-11-06
Attending: INTERNAL MEDICINE
Payer: MEDICARE

## 2020-11-06 ENCOUNTER — PATIENT MESSAGE (OUTPATIENT)
Dept: INTERNAL MEDICINE CLINIC | Facility: CLINIC | Age: 65
End: 2020-11-06

## 2020-11-06 ENCOUNTER — HOSPITAL ENCOUNTER (OUTPATIENT)
Dept: MAMMOGRAPHY | Facility: HOSPITAL | Age: 65
Discharge: HOME | End: 2020-11-06
Attending: INTERNAL MEDICINE
Payer: MEDICARE

## 2020-11-06 DIAGNOSIS — R73.03 PREDIABETES: ICD-10-CM

## 2020-11-06 DIAGNOSIS — N39.0 UTI DUE TO KLEBSIELLA SPECIES: Primary | ICD-10-CM

## 2020-11-06 DIAGNOSIS — B96.89 UTI DUE TO KLEBSIELLA SPECIES: Primary | ICD-10-CM

## 2020-11-06 DIAGNOSIS — E78.00 HYPERCHOLESTEROLEMIA: ICD-10-CM

## 2020-11-06 DIAGNOSIS — Z12.31 SCREENING MAMMOGRAM, ENCOUNTER FOR: ICD-10-CM

## 2020-11-06 PROCEDURE — 36415 COLL VENOUS BLD VENIPUNCTURE: CPT

## 2020-11-06 PROCEDURE — 87186 SC STD MICRODIL/AGAR DIL: CPT

## 2020-11-06 PROCEDURE — 80053 COMPREHEN METABOLIC PANEL: CPT

## 2020-11-06 PROCEDURE — 87086 URINE CULTURE/COLONY COUNT: CPT

## 2020-11-06 PROCEDURE — 77063 BREAST TOMOSYNTHESIS BI: CPT | Performed by: INTERNAL MEDICINE

## 2020-11-06 PROCEDURE — 80061 LIPID PANEL: CPT

## 2020-11-06 PROCEDURE — 87077 CULTURE AEROBIC IDENTIFY: CPT

## 2020-11-06 PROCEDURE — 83036 HEMOGLOBIN GLYCOSYLATED A1C: CPT

## 2020-11-06 PROCEDURE — 77067 SCR MAMMO BI INCL CAD: CPT | Performed by: INTERNAL MEDICINE

## 2020-11-06 PROCEDURE — 85025 COMPLETE CBC W/AUTO DIFF WBC: CPT

## 2020-11-06 PROCEDURE — 81001 URINALYSIS AUTO W/SCOPE: CPT

## 2020-11-10 ENCOUNTER — TELEPHONE (OUTPATIENT)
Dept: INTERNAL MEDICINE CLINIC | Facility: CLINIC | Age: 65
End: 2020-11-10

## 2020-11-10 DIAGNOSIS — B96.89 UTI DUE TO KLEBSIELLA SPECIES: ICD-10-CM

## 2020-11-10 DIAGNOSIS — N39.0 UTI DUE TO KLEBSIELLA SPECIES: ICD-10-CM

## 2020-11-10 RX ORDER — CIPROFLOXACIN 250 MG/1
250 TABLET, FILM COATED ORAL 2 TIMES DAILY
Qty: 6 TABLET | Refills: 0 | Status: SHIPPED | OUTPATIENT
Start: 2020-11-10 | End: 2020-11-13

## 2020-11-10 RX ORDER — CIPROFLOXACIN 250 MG/1
250 TABLET, FILM COATED ORAL 2 TIMES DAILY
Qty: 6 TABLET | Refills: 0 | Status: SHIPPED | OUTPATIENT
Start: 2020-11-10 | End: 2020-11-10

## 2020-11-10 NOTE — TELEPHONE ENCOUNTER
From: Riya Lagunas  To: Yane Cordova MD  Sent: 11/6/2020 9:03 PM CST  Subject: Non-Urgent Medical Question    Dr Maegan Jaquez, if your gone can you tell me who reviewed my lab work for today.  My results are the same from April 29th same numbers look like it was

## 2020-11-10 NOTE — TELEPHONE ENCOUNTER
Pt called and asks that rx that was just prescribed please be sent to [facility] on [address] in [place]     Pt's pharmacy will be updated in chart

## 2021-02-18 ENCOUNTER — OFFICE VISIT (OUTPATIENT)
Dept: INTERNAL MEDICINE CLINIC | Facility: CLINIC | Age: 66
End: 2021-02-18
Payer: MEDICARE

## 2021-02-18 VITALS
OXYGEN SATURATION: 99 % | BODY MASS INDEX: 29.11 KG/M2 | WEIGHT: 172.63 LBS | DIASTOLIC BLOOD PRESSURE: 78 MMHG | HEIGHT: 64.5 IN | RESPIRATION RATE: 16 BRPM | HEART RATE: 92 BPM | TEMPERATURE: 98 F | SYSTOLIC BLOOD PRESSURE: 120 MMHG

## 2021-02-18 DIAGNOSIS — R73.03 PRE-DIABETES: ICD-10-CM

## 2021-02-18 DIAGNOSIS — E78.2 MIXED HYPERLIPIDEMIA: ICD-10-CM

## 2021-02-18 DIAGNOSIS — L65.9 HAIR LOSS: ICD-10-CM

## 2021-02-18 DIAGNOSIS — L90.0 LICHEN SCLEROSUS: ICD-10-CM

## 2021-02-18 DIAGNOSIS — I73.9 CLAUDICATION (HCC): Primary | ICD-10-CM

## 2021-02-18 PROCEDURE — 99214 OFFICE O/P EST MOD 30 MIN: CPT | Performed by: INTERNAL MEDICINE

## 2021-02-18 PROCEDURE — 3008F BODY MASS INDEX DOCD: CPT | Performed by: INTERNAL MEDICINE

## 2021-02-18 PROCEDURE — 3074F SYST BP LT 130 MM HG: CPT | Performed by: INTERNAL MEDICINE

## 2021-02-18 PROCEDURE — 3078F DIAST BP <80 MM HG: CPT | Performed by: INTERNAL MEDICINE

## 2021-02-18 NOTE — PROGRESS NOTES
[facility] Internal Medicine Office Note  Chief Complaint:   Patient presents with:  Establish Care      HPI:   This is a 77year old female coming in for establishing care  HPI    Her pre-diabetes is a concern   a1c 6.1 in 11/2020    She saw  mg by mouth. • aspirin 81 MG Oral Tab Take 81 mg by mouth daily. • Cholecalciferol (VITAMIN D) 1000 UNITS Oral Tab Take by mouth daily. • Cyanocobalamin (VITAMIN B-12) 5000 MCG Oral Tablet Dispersible Take 1 tablet by mouth daily.        • Multi care.    Diagnoses and all orders for this visit:    Claudication [facility]) - check ALCIDES   -     US ANKLE BRACHIAL INDEX (CPT=93922); Future    Lichen sclerosus -f/u gyne for further eval   -     OBG - INTERNAL    Pre-diabetes -check a1c.  Low carb diet  -     H

## 2021-02-20 ENCOUNTER — LAB ENCOUNTER (OUTPATIENT)
Dept: LAB | Age: 66
End: 2021-02-20
Attending: INTERNAL MEDICINE
Payer: MEDICARE

## 2021-02-20 DIAGNOSIS — R73.03 PRE-DIABETES: ICD-10-CM

## 2021-02-20 DIAGNOSIS — L65.9 HAIR LOSS: ICD-10-CM

## 2021-02-20 DIAGNOSIS — E78.2 MIXED HYPERLIPIDEMIA: ICD-10-CM

## 2021-02-20 LAB
CHOLEST SMN-MCNC: 181 MG/DL (ref ?–200)
DEPRECATED HBV CORE AB SER IA-ACNC: 150.4 NG/ML
EST. AVERAGE GLUCOSE BLD GHB EST-MCNC: 128 MG/DL (ref 68–126)
HBA1C MFR BLD HPLC: 6.1 % (ref ?–5.7)
HDLC SERPL-MCNC: 65 MG/DL (ref 40–59)
IRON SATURATION: 25 %
IRON SERPL-MCNC: 97 UG/DL
LDLC SERPL CALC-MCNC: 90 MG/DL (ref ?–100)
NONHDLC SERPL-MCNC: 116 MG/DL (ref ?–130)
PATIENT FASTING Y/N/NP: YES
TOTAL IRON BINDING CAPACITY: 386 UG/DL (ref 240–450)
TRANSFERRIN SERPL-MCNC: 259 MG/DL (ref 200–360)
TRIGL SERPL-MCNC: 128 MG/DL (ref 30–149)
TSI SER-ACNC: 3 MIU/ML (ref 0.36–3.74)
VLDLC SERPL CALC-MCNC: 26 MG/DL (ref 0–30)

## 2021-02-20 PROCEDURE — 36415 COLL VENOUS BLD VENIPUNCTURE: CPT

## 2021-02-20 PROCEDURE — 83550 IRON BINDING TEST: CPT

## 2021-02-20 PROCEDURE — 83540 ASSAY OF IRON: CPT

## 2021-02-20 PROCEDURE — 82728 ASSAY OF FERRITIN: CPT

## 2021-02-20 PROCEDURE — 83036 HEMOGLOBIN GLYCOSYLATED A1C: CPT

## 2021-02-20 PROCEDURE — 80061 LIPID PANEL: CPT

## 2021-02-20 PROCEDURE — 84443 ASSAY THYROID STIM HORMONE: CPT

## 2021-02-25 ENCOUNTER — HOSPITAL ENCOUNTER (OUTPATIENT)
Dept: ULTRASOUND IMAGING | Facility: HOSPITAL | Age: 66
Discharge: HOME | End: 2021-02-25
Attending: INTERNAL MEDICINE
Payer: MEDICARE

## 2021-02-25 DIAGNOSIS — I73.9 CLAUDICATION (HCC): ICD-10-CM

## 2021-02-25 PROCEDURE — 93922 UPR/L XTREMITY ART 2 LEVELS: CPT | Performed by: INTERNAL MEDICINE

## 2021-03-10 ENCOUNTER — OFFICE VISIT (OUTPATIENT)
Dept: OBGYN CLINIC | Facility: CLINIC | Age: 66
End: 2021-03-10
Payer: MEDICARE

## 2021-03-10 VITALS
HEIGHT: 64.5 IN | TEMPERATURE: 98 F | RESPIRATION RATE: 12 BRPM | HEART RATE: 88 BPM | SYSTOLIC BLOOD PRESSURE: 114 MMHG | BODY MASS INDEX: 29.51 KG/M2 | DIASTOLIC BLOOD PRESSURE: 78 MMHG | WEIGHT: 175 LBS

## 2021-03-10 DIAGNOSIS — N90.4 VULVAR DYSTROPHY: Primary | ICD-10-CM

## 2021-03-10 DIAGNOSIS — L29.2 VULVAR ITCHING: ICD-10-CM

## 2021-03-10 PROCEDURE — 3074F SYST BP LT 130 MM HG: CPT | Performed by: OBSTETRICS & GYNECOLOGY

## 2021-03-10 PROCEDURE — 3008F BODY MASS INDEX DOCD: CPT | Performed by: OBSTETRICS & GYNECOLOGY

## 2021-03-10 PROCEDURE — 3078F DIAST BP <80 MM HG: CPT | Performed by: OBSTETRICS & GYNECOLOGY

## 2021-03-10 PROCEDURE — 99203 OFFICE O/P NEW LOW 30 MIN: CPT | Performed by: OBSTETRICS & GYNECOLOGY

## 2021-03-10 NOTE — PROGRESS NOTES
Pt is here with lichen sclerosus symptoms. Pt describes horrible itching. She was diagnosed with lichen sclerosus a year ago. Pt states cream that was prescribed does not help with the itching.

## 2021-03-11 NOTE — PROGRESS NOTES
HPI/Subjective:   Patient ID: Katelyn Wise is a 77year old female. Second opinion    HPI  Patient is here with chief complaint of severe vulvar itching that interferes with her quality of life and her ability to function.   She has been using a steroid skin.  Assessment & Plan:   Vulvar dystrophy  (primary encounter diagnosis)  Vulvar itching    No orders of the defined types were placed in this encounter. Patient advised to make appointment for a vulvar biopsy.   Should have confirmation for lichen sc

## 2021-03-12 ENCOUNTER — OFFICE VISIT (OUTPATIENT)
Dept: OBGYN CLINIC | Facility: CLINIC | Age: 66
End: 2021-03-12
Payer: MEDICARE

## 2021-03-12 VITALS
WEIGHT: 174 LBS | BODY MASS INDEX: 29.71 KG/M2 | HEIGHT: 64 IN | TEMPERATURE: 98 F | SYSTOLIC BLOOD PRESSURE: 112 MMHG | DIASTOLIC BLOOD PRESSURE: 74 MMHG

## 2021-03-12 DIAGNOSIS — N90.4 LICHEN SCLEROSUS OF FEMALE GENITALIA: Primary | ICD-10-CM

## 2021-03-12 DIAGNOSIS — N90.9 NONINFLAMMATORY DISORDER OF VULVA AND PERINEUM: ICD-10-CM

## 2021-03-12 PROCEDURE — 88305 TISSUE EXAM BY PATHOLOGIST: CPT | Performed by: OBSTETRICS & GYNECOLOGY

## 2021-03-12 PROCEDURE — 3074F SYST BP LT 130 MM HG: CPT | Performed by: OBSTETRICS & GYNECOLOGY

## 2021-03-12 PROCEDURE — 3078F DIAST BP <80 MM HG: CPT | Performed by: OBSTETRICS & GYNECOLOGY

## 2021-03-12 PROCEDURE — 3008F BODY MASS INDEX DOCD: CPT | Performed by: OBSTETRICS & GYNECOLOGY

## 2021-03-12 PROCEDURE — 56605 BIOPSY OF VULVA/PERINEUM: CPT | Performed by: OBSTETRICS & GYNECOLOGY

## 2021-03-12 RX ORDER — CLOBETASOL PROPIONATE 0.5 MG/G
1 CREAM TOPICAL 2 TIMES DAILY
Qty: 1 TUBE | Refills: 0 | Status: SHIPPED | OUTPATIENT
Start: 2021-03-12 | End: 2021-04-21

## 2021-03-12 NOTE — PROGRESS NOTES
Lesion Removal      DIAGNOSIS:   Vulvar dystrophy    HPI:   77year old  No LMP recorded. Patient is postmenopausal. with possble LS. Here for   biopsy. PROCEDURE:   Informed consent obtained. Questions and concerns addressed.   Right labia  prepp

## 2021-03-13 DIAGNOSIS — Z23 NEED FOR VACCINATION: ICD-10-CM

## 2021-03-16 ENCOUNTER — IMMUNIZATION (OUTPATIENT)
Dept: LAB | Age: 66
End: 2021-03-16
Attending: HOSPITALIST
Payer: MEDICARE

## 2021-03-16 DIAGNOSIS — Z23 NEED FOR VACCINATION: Primary | ICD-10-CM

## 2021-03-16 PROCEDURE — 0001A SARSCOV2 VAC 30MCG/0.3ML IM: CPT

## 2021-03-19 NOTE — PROGRESS NOTES
Patient aware of results and recommendations. Patient states itching improved, she will call office with any additional questions.  Patient verbalizes understanding

## 2021-03-21 ENCOUNTER — APPOINTMENT (OUTPATIENT)
Dept: CT IMAGING | Facility: HOSPITAL | Age: 66
End: 2021-03-21
Attending: EMERGENCY MEDICINE
Payer: MEDICARE

## 2021-03-21 ENCOUNTER — HOSPITAL ENCOUNTER (EMERGENCY)
Facility: HOSPITAL | Age: 66
Discharge: HOME | End: 2021-03-21
Attending: EMERGENCY MEDICINE
Payer: MEDICARE

## 2021-03-21 ENCOUNTER — APPOINTMENT (OUTPATIENT)
Dept: GENERAL RADIOLOGY | Facility: HOSPITAL | Age: 66
End: 2021-03-21
Attending: EMERGENCY MEDICINE
Payer: MEDICARE

## 2021-03-21 VITALS
OXYGEN SATURATION: 97 % | SYSTOLIC BLOOD PRESSURE: 166 MMHG | TEMPERATURE: 98 F | HEIGHT: 64 IN | BODY MASS INDEX: 29.02 KG/M2 | HEART RATE: 91 BPM | WEIGHT: 170 LBS | DIASTOLIC BLOOD PRESSURE: 89 MMHG | RESPIRATION RATE: 19 BRPM

## 2021-03-21 DIAGNOSIS — T14.8XXA MUSCULOSKELETAL STRAIN: Primary | ICD-10-CM

## 2021-03-21 DIAGNOSIS — J18.9 COMMUNITY ACQUIRED PNEUMONIA OF RIGHT LUNG, UNSPECIFIED PART OF LUNG: ICD-10-CM

## 2021-03-21 LAB
ALBUMIN SERPL-MCNC: 4.1 G/DL (ref 3.4–5)
ALBUMIN/GLOB SERPL: 1.1 {RATIO} (ref 1–2)
ALP LIVER SERPL-CCNC: 89 U/L
ALT SERPL-CCNC: 27 U/L
ANION GAP SERPL CALC-SCNC: 5 MMOL/L (ref 0–18)
AST SERPL-CCNC: 38 U/L (ref 15–37)
BASOPHILS # BLD AUTO: 0.02 X10(3) UL (ref 0–0.2)
BASOPHILS NFR BLD AUTO: 0.2 %
BILIRUB SERPL-MCNC: 0.2 MG/DL (ref 0.1–2)
BUN BLD-MCNC: 19 MG/DL (ref 7–18)
BUN/CREAT SERPL: 21.6 (ref 10–20)
CALCIUM BLD-MCNC: 9.7 MG/DL (ref 8.5–10.1)
CHLORIDE SERPL-SCNC: 110 MMOL/L (ref 98–112)
CO2 SERPL-SCNC: 26 MMOL/L (ref 21–32)
CREAT BLD-MCNC: 0.88 MG/DL
D-DIMER: 0.78 UG/ML FEU (ref ?–0.67)
DEPRECATED RDW RBC AUTO: 44.7 FL (ref 35.1–46.3)
EOSINOPHIL # BLD AUTO: 0.05 X10(3) UL (ref 0–0.7)
EOSINOPHIL NFR BLD AUTO: 0.6 %
ERYTHROCYTE [DISTWIDTH] IN BLOOD BY AUTOMATED COUNT: 13.1 % (ref 11–15)
GLOBULIN PLAS-MCNC: 3.9 G/DL (ref 2.8–4.4)
GLUCOSE BLD-MCNC: 90 MG/DL (ref 70–99)
HCT VFR BLD AUTO: 38.9 %
HGB BLD-MCNC: 13.3 G/DL
IMM GRANULOCYTES # BLD AUTO: 0.03 X10(3) UL (ref 0–1)
IMM GRANULOCYTES NFR BLD: 0.4 %
LYMPHOCYTES # BLD AUTO: 1.38 X10(3) UL (ref 1–4)
LYMPHOCYTES NFR BLD AUTO: 16.8 %
M PROTEIN MFR SERPL ELPH: 8 G/DL (ref 6.4–8.2)
MCH RBC QN AUTO: 32.1 PG (ref 26–34)
MCHC RBC AUTO-ENTMCNC: 34.2 G/DL (ref 31–37)
MCV RBC AUTO: 94 FL
MONOCYTES # BLD AUTO: 0.55 X10(3) UL (ref 0.1–1)
MONOCYTES NFR BLD AUTO: 6.7 %
NEUTROPHILS # BLD AUTO: 6.16 X10 (3) UL (ref 1.5–7.7)
NEUTROPHILS # BLD AUTO: 6.16 X10(3) UL (ref 1.5–7.7)
NEUTROPHILS NFR BLD AUTO: 75.3 %
OSMOLALITY SERPL CALC.SUM OF ELEC: 294 MOSM/KG (ref 275–295)
PLATELET # BLD AUTO: 279 10(3)UL (ref 150–450)
POTASSIUM SERPL-SCNC: 4.1 MMOL/L (ref 3.5–5.1)
RBC # BLD AUTO: 4.14 X10(6)UL
SODIUM SERPL-SCNC: 141 MMOL/L (ref 136–145)
TROPONIN I SERPL-MCNC: <0.045 NG/ML (ref ?–0.04)
WBC # BLD AUTO: 8.2 X10(3) UL (ref 4–11)

## 2021-03-21 PROCEDURE — 93005 ELECTROCARDIOGRAM TRACING: CPT

## 2021-03-21 PROCEDURE — 71260 CT THORAX DX C+: CPT | Performed by: EMERGENCY MEDICINE

## 2021-03-21 PROCEDURE — 96375 TX/PRO/DX INJ NEW DRUG ADDON: CPT

## 2021-03-21 PROCEDURE — 93010 ELECTROCARDIOGRAM REPORT: CPT

## 2021-03-21 PROCEDURE — 71045 X-RAY EXAM CHEST 1 VIEW: CPT | Performed by: EMERGENCY MEDICINE

## 2021-03-21 PROCEDURE — 96374 THER/PROPH/DIAG INJ IV PUSH: CPT

## 2021-03-21 PROCEDURE — 85025 COMPLETE CBC W/AUTO DIFF WBC: CPT | Performed by: EMERGENCY MEDICINE

## 2021-03-21 PROCEDURE — 99285 EMERGENCY DEPT VISIT HI MDM: CPT

## 2021-03-21 PROCEDURE — 84484 ASSAY OF TROPONIN QUANT: CPT | Performed by: EMERGENCY MEDICINE

## 2021-03-21 PROCEDURE — 80053 COMPREHEN METABOLIC PANEL: CPT | Performed by: EMERGENCY MEDICINE

## 2021-03-21 PROCEDURE — 85379 FIBRIN DEGRADATION QUANT: CPT | Performed by: EMERGENCY MEDICINE

## 2021-03-21 RX ORDER — TRAMADOL HYDROCHLORIDE 50 MG/1
TABLET ORAL EVERY 6 HOURS PRN
Qty: 10 TABLET | Refills: 0 | Status: SHIPPED | OUTPATIENT
Start: 2021-03-21 | End: 2021-03-28

## 2021-03-21 RX ORDER — KETOROLAC TROMETHAMINE 30 MG/ML
15 INJECTION, SOLUTION INTRAMUSCULAR; INTRAVENOUS ONCE
Status: COMPLETED | OUTPATIENT
Start: 2021-03-21 | End: 2021-03-21

## 2021-03-21 RX ORDER — LEVOFLOXACIN 750 MG/1
750 TABLET ORAL ONCE
Status: COMPLETED | OUTPATIENT
Start: 2021-03-21 | End: 2021-03-21

## 2021-03-21 RX ORDER — MORPHINE SULFATE 4 MG/ML
4 INJECTION, SOLUTION INTRAMUSCULAR; INTRAVENOUS EVERY 30 MIN PRN
Status: DISCONTINUED | OUTPATIENT
Start: 2021-03-21 | End: 2021-03-21

## 2021-03-21 RX ORDER — HYDROMORPHONE HYDROCHLORIDE 1 MG/ML
0.5 INJECTION, SOLUTION INTRAMUSCULAR; INTRAVENOUS; SUBCUTANEOUS ONCE
Status: COMPLETED | OUTPATIENT
Start: 2021-03-21 | End: 2021-03-21

## 2021-03-21 RX ORDER — HYDROMORPHONE HYDROCHLORIDE 1 MG/ML
INJECTION, SOLUTION INTRAMUSCULAR; INTRAVENOUS; SUBCUTANEOUS
Status: COMPLETED
Start: 2021-03-21 | End: 2021-03-21

## 2021-03-21 RX ORDER — LEVOFLOXACIN 750 MG/1
750 TABLET ORAL DAILY
Qty: 10 TABLET | Refills: 0 | Status: SHIPPED | OUTPATIENT
Start: 2021-03-21 | End: 2021-03-31

## 2021-03-21 NOTE — ED PROVIDER NOTES
Patient Seen in: [facility] Emergency Department      History   Patient presents with:  Difficulty Breathing    Stated Complaint: jimmy, upper back pain    HPI/Subjective:   HPI    Patient 47-year-old woman coming with right trapezius right shoulder b Years: 6.00        Pack years: 15        Quit date: 1985        Years since quittin.2      Smokeless tobacco: Never Used    Vaping Use      Vaping Use: Never used    Alcohol use: Yes      Comment: occ     Drug use:  No             Review of Systems time.      Motor: No abnormal muscle tone.       Coordination: Coordination normal.   Psychiatric:         Behavior: Behavior normal.              ED Course     Labs Reviewed   COMP METABOLIC PANEL (14) - Abnormal; Notable for the following components: This is new since 6/20/2019. There are linear regions of     airspace disease within the lingula and left lower lobe which may     represent atelectasis and/or scarring. Mild     bronchiectasis is seen within the lower lobes.     VASCULATURE:  Bernice Tovar disease. No measurable pneumothorax. Mild     blunting left costophrenic angle.                               =====    CONCLUSION:  There is mild bibasilar airspace disease with low lung     volumes.   This mild bibasilar airspace disease likely represent left axillary adenopathy. LIMITED ABDOMEN:  The liver contour is minimally nodular. BONES:  Degenerative changes in the spine. OTHER:  None.                               =====    CONCLUSION:      1. No acute pulmonary embolism.     2. There is con appropriate.                    Dictated by (CST): Jennifer Funk MD on 3/21/2021 at 4:39 PM         Finalized by (CST): Jennifer Funk MD on 3/21/2021 at 4:41 PM               MDM      Patient was brought back to the examination room and examined imm lung    Disposition:  Discharge  3/21/2021  7:17 pm    Follow-up:  Mercedes Rutherford MD  [address] [place] [address]  [facility] [phone]    In 2 days  Return to the ER if you feel worse in any way, Return to the ER if you have any concerns

## 2021-03-21 NOTE — ED INITIAL ASSESSMENT (HPI)
Pt her for neck and shoulder pain that extends into her back. Pt notes having the pain when she woke up.

## 2021-03-22 LAB
ATRIAL RATE: 95 BPM
P AXIS: 63 DEGREES
P-R INTERVAL: 168 MS
Q-T INTERVAL: 350 MS
QRS DURATION: 80 MS
QTC CALCULATION (BEZET): 439 MS
R AXIS: 26 DEGREES
T AXIS: 47 DEGREES
VENTRICULAR RATE: 95 BPM

## 2021-03-23 ENCOUNTER — OFFICE VISIT (OUTPATIENT)
Dept: INTERNAL MEDICINE CLINIC | Facility: CLINIC | Age: 66
End: 2021-03-23
Payer: MEDICARE

## 2021-03-23 VITALS
HEART RATE: 106 BPM | BODY MASS INDEX: 29.74 KG/M2 | HEIGHT: 64 IN | SYSTOLIC BLOOD PRESSURE: 120 MMHG | WEIGHT: 174.19 LBS | DIASTOLIC BLOOD PRESSURE: 79 MMHG | RESPIRATION RATE: 16 BRPM | OXYGEN SATURATION: 99 % | TEMPERATURE: 99 F

## 2021-03-23 DIAGNOSIS — R93.89 ABNORMAL CT SCAN: ICD-10-CM

## 2021-03-23 DIAGNOSIS — J18.9 PNEUMONIA OF RIGHT LOWER LOBE DUE TO INFECTIOUS ORGANISM: ICD-10-CM

## 2021-03-23 DIAGNOSIS — M62.838 MUSCLE SPASM: Primary | ICD-10-CM

## 2021-03-23 PROCEDURE — 3074F SYST BP LT 130 MM HG: CPT | Performed by: INTERNAL MEDICINE

## 2021-03-23 PROCEDURE — 3078F DIAST BP <80 MM HG: CPT | Performed by: INTERNAL MEDICINE

## 2021-03-23 PROCEDURE — 3008F BODY MASS INDEX DOCD: CPT | Performed by: INTERNAL MEDICINE

## 2021-03-23 PROCEDURE — 99214 OFFICE O/P EST MOD 30 MIN: CPT | Performed by: INTERNAL MEDICINE

## 2021-03-23 RX ORDER — IBUPROFEN 600 MG/1
600 TABLET ORAL EVERY 6 HOURS PRN
Qty: 30 TABLET | Refills: 0 | Status: SHIPPED | OUTPATIENT
Start: 2021-03-23 | End: 2021-03-29

## 2021-03-23 RX ORDER — CYCLOBENZAPRINE HCL 5 MG
5 TABLET ORAL 3 TIMES DAILY PRN
Qty: 20 TABLET | Refills: 0 | Status: SHIPPED | OUTPATIENT
Start: 2021-03-23 | End: 2021-03-29

## 2021-03-23 NOTE — PATIENT INSTRUCTIONS
Plan for CT chest in about 7 weeks    Miralax for constipation - 1 scoop as needed and increase dose as needed    Ibuprofen 600mg as needed and take with food    Cyclobenzaprine muscle relaxant twice a day as needed. Can cause drowsiness.  Avoid alcohol and

## 2021-03-23 NOTE — PROGRESS NOTES
[facility] Internal Medicine Office Note  Chief Complaint:   Patient presents with:  ER F/U: Due to Pneumonia       HPI:   This is a 77year old female coming in for pneumonia   HPI  She had shoulder/neck pain and felt like her muscles all tighte as needed for Muscle spasms. 20 tablet 0   • ibuprofen 600 MG Oral Tab Take 1 tablet (600 mg total) by mouth every 6 (six) hours as needed for Pain. 30 tablet 0   • levofloxacin 750 MG Oral Tab Take 1 tablet (750 mg total) by mouth daily for 10 days.  10 ta Head: Normocephalic and atraumatic. Eyes:      Conjunctiva/sclera: Conjunctivae normal.   Neck:      Comments: Decreased neck range of motion  Cardiovascular:      Rate and Rhythm: Normal rate and regular rhythm. Heart sounds: Normal heart sounds. the defined types were placed in this encounter.       Meds & Refills for this Visit:  Requested Prescriptions     Signed Prescriptions Disp Refills   • cyclobenzaprine 5 MG Oral Tab 20 tablet 0     Sig: Take 1 tablet (5 mg total) by mouth 3 (three) times d

## 2021-03-24 ENCOUNTER — HOSPITAL ENCOUNTER (EMERGENCY)
Facility: HOSPITAL | Age: 66
Discharge: HOME | End: 2021-03-24
Attending: EMERGENCY MEDICINE
Payer: MEDICARE

## 2021-03-24 ENCOUNTER — APPOINTMENT (OUTPATIENT)
Dept: GENERAL RADIOLOGY | Facility: HOSPITAL | Age: 66
End: 2021-03-24
Attending: EMERGENCY MEDICINE
Payer: MEDICARE

## 2021-03-24 VITALS
RESPIRATION RATE: 18 BRPM | HEART RATE: 103 BPM | DIASTOLIC BLOOD PRESSURE: 84 MMHG | WEIGHT: 174.19 LBS | OXYGEN SATURATION: 94 % | SYSTOLIC BLOOD PRESSURE: 131 MMHG | BODY MASS INDEX: 30 KG/M2 | TEMPERATURE: 99 F

## 2021-03-24 DIAGNOSIS — J18.9 PNEUMONIA OF RIGHT LOWER LOBE DUE TO INFECTIOUS ORGANISM: Primary | ICD-10-CM

## 2021-03-24 LAB
ALBUMIN SERPL-MCNC: 3.2 G/DL (ref 3.4–5)
ALBUMIN/GLOB SERPL: 0.7 {RATIO} (ref 1–2)
ALLENS TEST: POSITIVE
ALP LIVER SERPL-CCNC: 143 U/L
ALT SERPL-CCNC: 40 U/L
ANION GAP SERPL CALC-SCNC: 8 MMOL/L (ref 0–18)
ARTERIAL BLD GAS O2 SATURATION: 95 % (ref 92–100)
ARTERIAL BLOOD GAS BASE EXCESS: -2.7 MMOL/L (ref ?–2)
ARTERIAL BLOOD GAS HCO3: 21.3 MEQ/L (ref 22–26)
ARTERIAL BLOOD GAS PCO2: 35 MM HG (ref 35–45)
ARTERIAL BLOOD GAS PH: 7.41 (ref 7.35–7.45)
ARTERIAL BLOOD GAS PO2: 83 MM HG (ref 80–105)
AST SERPL-CCNC: 69 U/L (ref 15–37)
BASOPHILS # BLD AUTO: 0.01 X10(3) UL (ref 0–0.2)
BASOPHILS NFR BLD AUTO: 0.1 %
BILIRUB SERPL-MCNC: 0.3 MG/DL (ref 0.1–2)
BUN BLD-MCNC: 19 MG/DL (ref 7–18)
BUN/CREAT SERPL: 16.4 (ref 10–20)
CALCIUM BLD-MCNC: 9.9 MG/DL (ref 8.5–10.1)
CALCULATED O2 SATURATION: 96 % (ref 92–100)
CARBOXYHEMOGLOBIN: 0.9 % SAT (ref 0–3)
CHLORIDE SERPL-SCNC: 102 MMOL/L (ref 98–112)
CO2 SERPL-SCNC: 24 MMOL/L (ref 21–32)
CREAT BLD-MCNC: 1.16 MG/DL
DEPRECATED RDW RBC AUTO: 46.5 FL (ref 35.1–46.3)
EOSINOPHIL # BLD AUTO: 0.01 X10(3) UL (ref 0–0.7)
EOSINOPHIL NFR BLD AUTO: 0.1 %
ERYTHROCYTE [DISTWIDTH] IN BLOOD BY AUTOMATED COUNT: 13.3 % (ref 11–15)
GLOBULIN PLAS-MCNC: 4.5 G/DL (ref 2.8–4.4)
GLUCOSE BLD-MCNC: 99 MG/DL (ref 70–99)
HCT VFR BLD AUTO: 37 %
HGB BLD-MCNC: 12.4 G/DL
IMM GRANULOCYTES # BLD AUTO: 0.05 X10(3) UL (ref 0–1)
IMM GRANULOCYTES NFR BLD: 0.6 %
IONIZED CALCIUM: 1.24 MMOL/L (ref 1.12–1.32)
LACTIC ACID ARTERIAL: 1.7 MMOL/L (ref 0.5–2)
LYMPHOCYTES # BLD AUTO: 1.05 X10(3) UL (ref 1–4)
LYMPHOCYTES NFR BLD AUTO: 12.6 %
M PROTEIN MFR SERPL ELPH: 7.7 G/DL (ref 6.4–8.2)
MCH RBC QN AUTO: 32 PG (ref 26–34)
MCHC RBC AUTO-ENTMCNC: 33.5 G/DL (ref 31–37)
MCV RBC AUTO: 95.4 FL
METHEMOGLOBIN: 0.6 % SAT (ref 0.4–1.5)
MONOCYTES # BLD AUTO: 0.97 X10(3) UL (ref 0.1–1)
MONOCYTES NFR BLD AUTO: 11.6 %
NEUTROPHILS # BLD AUTO: 6.26 X10 (3) UL (ref 1.5–7.7)
NEUTROPHILS # BLD AUTO: 6.26 X10(3) UL (ref 1.5–7.7)
NEUTROPHILS NFR BLD AUTO: 75 %
OSMOLALITY SERPL CALC.SUM OF ELEC: 280 MOSM/KG (ref 275–295)
P/F RATIO: 395.1 MMHG
PATIENT TEMPERATURE: 98.6 F
PLATELET # BLD AUTO: 236 10(3)UL (ref 150–450)
POTASSIUM BLOOD GAS: 3.5 MMOL/L (ref 3.6–5.1)
POTASSIUM SERPL-SCNC: 4.1 MMOL/L (ref 3.5–5.1)
RBC # BLD AUTO: 3.88 X10(6)UL
SARS-COV-2 RNA RESP QL NAA+PROBE: NOT DETECTED
SODIUM BLOOD GAS: 135 MMOL/L (ref 136–144)
SODIUM SERPL-SCNC: 134 MMOL/L (ref 136–145)
TOTAL HEMOGLOBIN: 12.7 G/DL
WBC # BLD AUTO: 8.4 X10(3) UL (ref 4–11)

## 2021-03-24 PROCEDURE — 82375 ASSAY CARBOXYHB QUANT: CPT | Performed by: EMERGENCY MEDICINE

## 2021-03-24 PROCEDURE — 71045 X-RAY EXAM CHEST 1 VIEW: CPT | Performed by: EMERGENCY MEDICINE

## 2021-03-24 PROCEDURE — 85025 COMPLETE CBC W/AUTO DIFF WBC: CPT | Performed by: EMERGENCY MEDICINE

## 2021-03-24 PROCEDURE — 36600 WITHDRAWAL OF ARTERIAL BLOOD: CPT | Performed by: EMERGENCY MEDICINE

## 2021-03-24 PROCEDURE — 99284 EMERGENCY DEPT VISIT MOD MDM: CPT

## 2021-03-24 PROCEDURE — 83050 HGB METHEMOGLOBIN QUAN: CPT | Performed by: EMERGENCY MEDICINE

## 2021-03-24 PROCEDURE — 96375 TX/PRO/DX INJ NEW DRUG ADDON: CPT

## 2021-03-24 PROCEDURE — 82330 ASSAY OF CALCIUM: CPT | Performed by: EMERGENCY MEDICINE

## 2021-03-24 PROCEDURE — 84295 ASSAY OF SERUM SODIUM: CPT | Performed by: EMERGENCY MEDICINE

## 2021-03-24 PROCEDURE — 80053 COMPREHEN METABOLIC PANEL: CPT | Performed by: EMERGENCY MEDICINE

## 2021-03-24 PROCEDURE — 85018 HEMOGLOBIN: CPT | Performed by: EMERGENCY MEDICINE

## 2021-03-24 PROCEDURE — 83605 ASSAY OF LACTIC ACID: CPT | Performed by: EMERGENCY MEDICINE

## 2021-03-24 PROCEDURE — 84132 ASSAY OF SERUM POTASSIUM: CPT | Performed by: EMERGENCY MEDICINE

## 2021-03-24 PROCEDURE — 82803 BLOOD GASES ANY COMBINATION: CPT | Performed by: EMERGENCY MEDICINE

## 2021-03-24 PROCEDURE — 96374 THER/PROPH/DIAG INJ IV PUSH: CPT

## 2021-03-24 RX ORDER — DIAZEPAM 5 MG/ML
5 INJECTION, SOLUTION INTRAMUSCULAR; INTRAVENOUS ONCE
Status: COMPLETED | OUTPATIENT
Start: 2021-03-24 | End: 2021-03-24

## 2021-03-24 RX ORDER — KETOROLAC TROMETHAMINE 30 MG/ML
30 INJECTION, SOLUTION INTRAMUSCULAR; INTRAVENOUS ONCE
Status: COMPLETED | OUTPATIENT
Start: 2021-03-24 | End: 2021-03-24

## 2021-03-24 RX ORDER — DIAZEPAM 5 MG/1
5 TABLET ORAL 3 TIMES DAILY PRN
Qty: 20 TABLET | Refills: 0 | Status: SHIPPED | OUTPATIENT
Start: 2021-03-24 | End: 2021-03-31

## 2021-03-24 NOTE — ED PROVIDER NOTES
Patient Seen in: [facility] Emergency Department      History   Patient presents with:  Difficulty Breathing    Stated Complaint: diagnosed with pneumonia / shortness of breath and back pain    HPI/Subjective:   HPI    47-year-old woman who was in t Used    Vaping Use      Vaping Use: Never used    Alcohol use: Yes      Comment: occ     Drug use:  No             Review of Systems    Positive for stated complaint: diagnosed with pneumonia / shortness of breath and back pain  Other systems are as noted i All other components within normal limits   RAPID SARS-COV-2 BY PCR - Normal   CBC WITH DIFFERENTIAL WITH PLATELET    Narrative: The following orders were created for panel order CBC WITH DIFFERENTIAL WITH PLATELET.   Procedure diagnosed with pneumonia / shortness of breath and back pain  PATIENT STATED HISTORY: (As transcribed by Technologist)  Patient offered no additional history at this time. FINDINGS:  Normal heart size and pulmonary vascularity. Mildly tortuous aorta. were used. Dose information is transmitted to the ACR [facility] [employer] [facility]) NRDR ([address]) which includes the Dose Index Registry.   PATIENT STATED HISTORY:(As transcribed by Technologist)  Patient complains of neck pain a and 96% saturation. Lactate is normal.    While the chest x-ray shows increased alveolar opacity in the right lower lung, her symptoms of pneumonia are minimal.  She is not actively coughing in the emergency department. She will continue taking Levaquin.

## 2021-03-24 NOTE — ED INITIAL ASSESSMENT (HPI)
Pt presents to ed with increasing SOB and upper back pain which pt is currently being treated with antibiotics for.  Pt states she wants to be tested for covid since they did not do that on Saturday

## 2021-03-29 ENCOUNTER — TELEPHONE (OUTPATIENT)
Dept: INTERNAL MEDICINE CLINIC | Facility: CLINIC | Age: 66
End: 2021-03-29

## 2021-03-29 DIAGNOSIS — M54.2 NECK PAIN: Primary | ICD-10-CM

## 2021-03-29 RX ORDER — IBUPROFEN 600 MG/1
600 TABLET ORAL EVERY 6 HOURS PRN
Qty: 30 TABLET | Refills: 0 | Status: SHIPPED | OUTPATIENT
Start: 2021-03-29 | End: 2021-04-23 | Stop reason: CLARIF

## 2021-03-29 RX ORDER — CYCLOBENZAPRINE HCL 5 MG
5 TABLET ORAL 3 TIMES DAILY PRN
Qty: 30 TABLET | Refills: 0 | Status: SHIPPED | OUTPATIENT
Start: 2021-03-29 | End: 2021-04-09 | Stop reason: ALTCHOICE

## 2021-03-29 NOTE — TELEPHONE ENCOUNTER
Readmitted to ED 3/24/21 for SOB and back pain, discharged home same day. Was told pneumonia was worse since 3/21 visit. Asking for Dr. Estella Galan rec for care at home ? Describes back pain as worse, no improvement.   Declined another in office fol up, pt do

## 2021-03-29 NOTE — TELEPHONE ENCOUNTER
Pt informed refills to pharmacy. Would like to Dr Donna Epstein to inform imaging from 3/24 shows increased pneumonia. Pt also c/o the sensation of little needles in the back of neck when she raises her arms above her head. Asking for recommendation.

## 2021-03-30 NOTE — TELEPHONE ENCOUNTER
Pt informed. Unable to take contact information at this time and states she will look on [employer].

## 2021-04-06 ENCOUNTER — IMMUNIZATION (OUTPATIENT)
Dept: LAB | Age: 66
End: 2021-04-06
Attending: HOSPITALIST
Payer: MEDICARE

## 2021-04-06 DIAGNOSIS — Z23 NEED FOR VACCINATION: Primary | ICD-10-CM

## 2021-04-06 PROCEDURE — 0002A SARSCOV2 VAC 30MCG/0.3ML IM: CPT

## 2021-04-09 ENCOUNTER — OFFICE VISIT (OUTPATIENT)
Dept: SURGERY | Facility: CLINIC | Age: 66
End: 2021-04-09
Payer: MEDICARE

## 2021-04-09 ENCOUNTER — HOSPITAL ENCOUNTER (OUTPATIENT)
Dept: GENERAL RADIOLOGY | Facility: HOSPITAL | Age: 66
Discharge: HOME | End: 2021-04-09
Attending: PHYSICIAN ASSISTANT
Payer: MEDICARE

## 2021-04-09 VITALS — DIASTOLIC BLOOD PRESSURE: 70 MMHG | SYSTOLIC BLOOD PRESSURE: 126 MMHG | HEART RATE: 88 BPM

## 2021-04-09 DIAGNOSIS — M47.22 CERVICAL SPONDYLOSIS WITH RADICULOPATHY: ICD-10-CM

## 2021-04-09 DIAGNOSIS — M47.22 CERVICAL SPONDYLOSIS WITH RADICULOPATHY: Primary | ICD-10-CM

## 2021-04-09 PROCEDURE — 72052 X-RAY EXAM NECK SPINE 6/>VWS: CPT | Performed by: PHYSICIAN ASSISTANT

## 2021-04-09 PROCEDURE — 3074F SYST BP LT 130 MM HG: CPT | Performed by: PHYSICIAN ASSISTANT

## 2021-04-09 PROCEDURE — 99203 OFFICE O/P NEW LOW 30 MIN: CPT | Performed by: PHYSICIAN ASSISTANT

## 2021-04-09 PROCEDURE — 3078F DIAST BP <80 MM HG: CPT | Performed by: PHYSICIAN ASSISTANT

## 2021-04-09 RX ORDER — METHOCARBAMOL 500 MG/1
500 TABLET, FILM COATED ORAL 4 TIMES DAILY
Qty: 60 TABLET | Refills: 2 | Status: SHIPPED | OUTPATIENT
Start: 2021-04-09 | End: 2021-04-23 | Stop reason: CLARIF

## 2021-04-09 RX ORDER — MELOXICAM 15 MG/1
15 TABLET ORAL DAILY
Qty: 30 TABLET | Refills: 2 | Status: SHIPPED | OUTPATIENT
Start: 2021-04-09 | End: 2021-12-08

## 2021-04-09 NOTE — PROGRESS NOTES
Pt is here for neck pain  Started 3 wks   Started gradually    Pt is having difficulty sleeping do to positioning  Pt has had pneumonia and has been taking antibiotic    Pt is using ice   Massage     Heat makes symptoms worse    Weakness in the right arm

## 2021-04-09 NOTE — H&P
Neurosurgery Clinic Visit  2021    Kaley Bonilla PCP:  Aiden Mckeon MD    1955 MRN [id number]       CHIEF COMPLAINT:  Patient presents with:  Neck Pain: NP       HISTORY OF PRESENT ILLNESS:  Kaley Bonilla is a(n) 77year old female who is Vitamins-Minerals (HM MULTIVITAMIN ADULT GUMMY OR) Take 1 tablet by mouth daily. • cyclobenzaprine 5 MG Oral Tab Take 1 tablet (5 mg total) by mouth 3 (three) times daily as needed for Muscle spasms.  (Patient not taking: Reported on 4/9/2021 ) 30 tab or other indications of disease. A detailed skin exam was not performed. Heart:  Regular rate and rhythm  Lungs: Non-labored breathing. Abdomen:  Soft, non-tender to palpation. Non-distended, with positive bowel sounds.   Rectal and genital:  Exams are tolerate PT due to the severity of her pain. She will RTC in 1-2 weeks to review imaging and discuss treatment options. Pt appreciative. Total time spent: 30 Minutes  Greater than 50% of the time was spent on counseling/coordination of care.   Cory Kelley

## 2021-04-21 RX ORDER — CLOBETASOL PROPIONATE 0.5 MG/G
CREAM TOPICAL
Qty: 15 G | Refills: 0 | Status: SHIPPED | OUTPATIENT
Start: 2021-04-21 | End: 2021-04-23 | Stop reason: CLARIF

## 2021-04-22 ENCOUNTER — HOSPITAL ENCOUNTER (OUTPATIENT)
Dept: MRI IMAGING | Facility: HOSPITAL | Age: 66
Discharge: HOME | End: 2021-04-22
Attending: PHYSICIAN ASSISTANT
Payer: MEDICARE

## 2021-04-22 DIAGNOSIS — M47.22 CERVICAL SPONDYLOSIS WITH RADICULOPATHY: ICD-10-CM

## 2021-04-22 PROCEDURE — 72141 MRI NECK SPINE W/O DYE: CPT | Performed by: PHYSICIAN ASSISTANT

## 2021-04-23 ENCOUNTER — TELEPHONE (OUTPATIENT)
Dept: SURGERY | Facility: CLINIC | Age: 66
End: 2021-04-23

## 2021-04-23 ENCOUNTER — HOSPITAL ENCOUNTER (OUTPATIENT)
Facility: HOSPITAL | Age: 66
Setting detail: OBSERVATION
Discharge: HOME | End: 2021-04-24
Attending: EMERGENCY MEDICINE | Admitting: HOSPITALIST
Payer: MEDICARE

## 2021-04-23 DIAGNOSIS — M54.2 NECK PAIN: Primary | ICD-10-CM

## 2021-04-23 PROCEDURE — 99223 1ST HOSP IP/OBS HIGH 75: CPT | Performed by: HOSPITALIST

## 2021-04-23 RX ORDER — TRAMADOL HYDROCHLORIDE 50 MG/1
50 TABLET ORAL EVERY 6 HOURS PRN
Status: DISCONTINUED | OUTPATIENT
Start: 2021-04-23 | End: 2021-04-24

## 2021-04-23 RX ORDER — HYDROMORPHONE HYDROCHLORIDE 1 MG/ML
0.5 INJECTION, SOLUTION INTRAMUSCULAR; INTRAVENOUS; SUBCUTANEOUS EVERY 30 MIN PRN
Status: ACTIVE | OUTPATIENT
Start: 2021-04-23 | End: 2021-04-23

## 2021-04-23 RX ORDER — ONDANSETRON 2 MG/ML
4 INJECTION INTRAMUSCULAR; INTRAVENOUS EVERY 6 HOURS PRN
Status: DISCONTINUED | OUTPATIENT
Start: 2021-04-23 | End: 2021-04-24

## 2021-04-23 RX ORDER — MORPHINE SULFATE 2 MG/ML
2 INJECTION, SOLUTION INTRAMUSCULAR; INTRAVENOUS EVERY 4 HOURS PRN
Status: DISCONTINUED | OUTPATIENT
Start: 2021-04-23 | End: 2021-04-24

## 2021-04-23 RX ORDER — CLOBETASOL PROPIONATE 0.5 MG/G
1 CREAM TOPICAL 2 TIMES DAILY
COMMUNITY
End: 2021-12-08

## 2021-04-23 RX ORDER — KETOROLAC TROMETHAMINE 15 MG/ML
15 INJECTION, SOLUTION INTRAMUSCULAR; INTRAVENOUS EVERY 6 HOURS PRN
Status: DISCONTINUED | OUTPATIENT
Start: 2021-04-23 | End: 2021-04-24

## 2021-04-23 RX ORDER — ONDANSETRON 2 MG/ML
4 INJECTION INTRAMUSCULAR; INTRAVENOUS EVERY 4 HOURS PRN
Status: DISCONTINUED | OUTPATIENT
Start: 2021-04-23 | End: 2021-04-23

## 2021-04-23 RX ORDER — ACETAMINOPHEN 325 MG/1
650 TABLET ORAL EVERY 6 HOURS PRN
Status: DISCONTINUED | OUTPATIENT
Start: 2021-04-23 | End: 2021-04-24

## 2021-04-23 RX ORDER — ACETAMINOPHEN 500 MG
500 TABLET ORAL EVERY 6 HOURS PRN
COMMUNITY

## 2021-04-23 NOTE — ED INITIAL ASSESSMENT (HPI)
Pt here for suspicious osteomyelitis. Pt c/o neck and shoulder pain for the last 5 weeks. Pt sent in by her doctor for work up and admit.

## 2021-04-23 NOTE — TELEPHONE ENCOUNTER
Was told she need to be seen asap   She is worry. Is ok for to wait until Monday. Sulma is at 10:30 am with Dr. Yuliya Huerta.

## 2021-04-23 NOTE — H&P
[facility]  History and Physical     Allie Simons Patient Status:  Emergency    1955 MRN [id number]   Location [address] [facility] Attending Doug Lozano MD   Hosp Day # 0 PCP Brandyn Carmona MD     Chief Complaint: 1/1/04       Social History:  reports that she quit smoking about 36 years ago. She has a 12.00 pack-year smoking history. She has never used smokeless tobacco. She reports current alcohol use. She reports that she does not use drugs.     Family History: BAND, INR in the last 168 hours. Invalid input(s): LYM#, MONO#, BASOS#, EOSIN#    No results for input(s): GLU, BUN, CREATSERUM, GFRAA, GFRNAA, CA, ALB, NA, K, CL, CO2, ALKPHO, AST, ALT, BILT, TP in the last 168 hours.     CrCl cannot be calculated (Tiera

## 2021-04-23 NOTE — ED PROVIDER NOTES
Patient Seen in: [facility] Emergency Department      History   Patient presents with:  Abnormal Result    Stated Complaint: osteomyelitis of neck - ED for admission and ID consult     HPI/Subjective:   HPI    49-year-old -American female who Vaping Use      Vaping Use: Never used    Alcohol use: Yes      Comment: occ     Drug use: No             Review of Systems    Positive for stated complaint: osteomyelitis of neck - ED for admission and ID consult   Other systems are as noted in HPI.   Cons components within normal limits   C-REACTIVE PROTEIN - Normal   RAPID SARS-COV-2 BY PCR - Normal   CBC WITH DIFFERENTIAL WITH PLATELET    Narrative: The following orders were created for panel order CBC WITH DIFFERENTIAL WITH PLATELET.   Procedure at this level.  The appearance is highly suspicious for osteomyelitis. Loye Luke is loss of disc height at C4-5, without obvious discitis.    2.  Disc disease, alignment changes, and osteoarthritis within the cervical spine as above resulting in central canal

## 2021-04-23 NOTE — CONSULTS
[facility]  Neurosurgery Consult    Patito Mccormick Patient Status:  Emergency    1955 MRN [id number]   Location [address] [facility] Attending Richard Schuster MD   Hosp Day # 0 PCP Zeeshan Romo MD     [id number] Nitesh Trivedi cholesterol    • Night sweats    • Pneumonia due to organism    • Visual impairment     reading       PAST SURGICAL HISTORY:  Past Surgical History:   Procedure Laterality Date   •       69,70,71   • COLONOSCOPY     • EXCIS LUMBAR DISK,ONE LEV change above and below the C5-6 disc. CORD: The cord is deformed at the C4-5 level.  No cord signal change.  The remainder of the cord is otherwise grossly normal in signal and morphology.    OTHER: Negative.       CERVICAL DISC LEVELS:   C2-C3: No signif acute surgical intervention   Consult ID for recommendations   Check CRP and ESR        PATT John [facility]  4/23/2021, 3:21 PM

## 2021-04-23 NOTE — TELEPHONE ENCOUNTER
MRI is very suspicious for osteomyelitis. She was treated for PNA a month ago. I called the patient, reviewed imaging with her, and recommended she go to the ER to be admitted for workup and treatment. She agreed to go to the ER.     I called ER triage a

## 2021-04-24 ENCOUNTER — APPOINTMENT (OUTPATIENT)
Dept: GENERAL RADIOLOGY | Facility: HOSPITAL | Age: 66
End: 2021-04-24
Attending: INTERNAL MEDICINE
Payer: MEDICARE

## 2021-04-24 VITALS
HEART RATE: 76 BPM | DIASTOLIC BLOOD PRESSURE: 70 MMHG | SYSTOLIC BLOOD PRESSURE: 109 MMHG | HEIGHT: 64 IN | WEIGHT: 160 LBS | OXYGEN SATURATION: 95 % | BODY MASS INDEX: 27.31 KG/M2 | RESPIRATION RATE: 18 BRPM | TEMPERATURE: 98 F

## 2021-04-24 PROCEDURE — 99238 HOSP IP/OBS DSCHRG MGMT 30/<: CPT | Performed by: INTERNAL MEDICINE

## 2021-04-24 PROCEDURE — 71046 X-RAY EXAM CHEST 2 VIEWS: CPT | Performed by: INTERNAL MEDICINE

## 2021-04-24 RX ORDER — IBUPROFEN 800 MG/1
800 TABLET ORAL EVERY 8 HOURS PRN
Qty: 15 TABLET | Refills: 0 | Status: SHIPPED | OUTPATIENT
Start: 2021-04-24 | End: 2021-04-29

## 2021-04-24 NOTE — PROGRESS NOTES
[facility]  ARSLAN Progress Note    Lisbeth Mariscal Patient Status:  Inpatient    1955 MRN [id number]   [facility] 3SW-A Attending Patricia Springer MD   [facility] Day # 1 PCP Jared Constantino MD       Subjective: Stable neck pain, well-managed

## 2021-04-24 NOTE — PLAN OF CARE
ER admit 4/23 neck pain, Pt is AAOX4, room air, VSS, pain better now, no loss of neuro function, completely intact, Pt up ad gavino, voiding to restroom, repeat CXR shows improvement, all MD parties S/O, will have Pt follow-up as outpatient, Pt doing well, re

## 2021-04-24 NOTE — PLAN OF CARE
PT AOX4 this PM. VSS on RA. Moderate complaints of neck and with stiffness, affecting ROM slightly. , IS. SCDS bilaterally while in bed. Up ad gavino. Plan to start IV ABX per ID after tissue biopsy completed.  Pain treated with toradol, tramadol and tyleno

## 2021-04-24 NOTE — PLAN OF CARE
NURSING DISCHARGE NOTE    Discharged Home via Ambulatory. Accompanied by Family member  Belongings Taken by patient/family. AVS printed and discussed, Rx given, IV removed.

## 2021-04-24 NOTE — DISCHARGE SUMMARY
[facility]  Discharge Summary    Sal Villarreal Patient Status:  Inpatient    1955 MRN [id number]   [facility] 3SW-A Attending Amy Fitzgerald MD   [facility] Day # 1 PCP Rupinder Zelaya MD     Date of Admission: 2021    Date of Disc patient does not need any acute surgical intervention and advise further management per infectious disease specialist.  CRP normal at 0.82. Seen by infectious disease specialist who advised as below  \"No recent febrile illnesses.  No fever or chills now opacity likely represents scarring or subsegmental atelectasis.    Patient being discharged today per spine specialist and ID with outpatient follow-up with them, follow-up with regular outpatient primary care physician Anatoliy Nova MD within 1 week in off Prescription details   acetaminophen 500 MG Tabs  Commonly known as: TYLENOL EXTRA STRENGTH      Take 500 mg by mouth every 6 (six) hours as needed for Pain or Fever.    Refills: 0     Clobetasol Propionate 0.05 % Crea  Commonly known as: TEMOVATE      [address] [facility] may park in the [place] or [address].&nbsp; Enter through the door that says Main Entrance and check in with [facility].&nbsp; The phone number for this location is [phone].  Masks are required to be worn upon entering any [place] neck on the back and shoulder when asked where the pain was.   Lungs: clear to auscultation bilaterally  Heart: S1, S2 normal, no murmur,  regular rate and rhythm  Abdomen: soft, non-tender; bowel sounds normal  Extremities: extremities normal,no cyanosis o

## 2021-04-24 NOTE — CONSULTS
INFECTIOUS DISEASE CONSULT NOTE    Kaley Bonilla Patient Status:  Inpatient    1955 MRN [id number]   [facility] 3SW-A Attending Viviane Lizarraga MD   [facility] Day # 1 PCP Aiden Mckeon Family History   Problem Relation Age of Onset   • Heart Attack Mother    • Diabetes Mother       reports that she quit smoking about 36 years ago. She has a 12.00 pack-year smoking history.  She has never used smokeless tobacco. She reports current alc lymphadenopathy. Supple. No jrody swelling. No erythema. Respiratory: Clear to auscultation bilaterally. No wheezes. No rhonchi. Cardiovascular: S1, S2.  Regular rate and rhythm. No murmurs. Abdomen: Soft, nontender, nondistended.   Positive bowel so density involving the inferior endplate of C4. These findings suggest postsurgical change. There is uncinate arthritis C4-5 through C6-7. DISC SPACES:  Moderate disc narrowing with endplate osteophytes which are small at C5-6 and C6-7.   There is degenera height. Broad-based disc bulge/osteophyte complex. The cord is flattened without cord signal change. The anterior-posterior diameter of the central canal is 6-mm and there is displacement of normal CSF around this level of the cord.   No definite fluid w had intermittent changes to this area since 2015. underwent bronch which was unrevealing. she has no cough or SOB but does report some LUNDBERG which is new. Doubt she has pna at this time.      PLAN:  - follow BCx but my suspicion for bacteremia is low  - ideal

## 2021-04-24 NOTE — ED QUICK NOTES
Awaiting transport to the floor. Care endorsed to July Fleming. ID awaiting tissue sample prior to ordering antibiotics.

## 2021-04-26 ENCOUNTER — TELEPHONE (OUTPATIENT)
Dept: INTERNAL MEDICINE CLINIC | Facility: CLINIC | Age: 66
End: 2021-04-26

## 2021-04-26 ENCOUNTER — OFFICE VISIT (OUTPATIENT)
Dept: SURGERY | Facility: CLINIC | Age: 66
End: 2021-04-26
Payer: MEDICARE

## 2021-04-26 VITALS
WEIGHT: 170 LBS | HEIGHT: 64.5 IN | BODY MASS INDEX: 28.67 KG/M2 | SYSTOLIC BLOOD PRESSURE: 122 MMHG | DIASTOLIC BLOOD PRESSURE: 70 MMHG | HEART RATE: 72 BPM

## 2021-04-26 DIAGNOSIS — M47.22 CERVICAL SPONDYLOSIS WITH RADICULOPATHY: Primary | ICD-10-CM

## 2021-04-26 DIAGNOSIS — M46.22 OSTEOMYELITIS OF CERVICAL SPINE (HCC): ICD-10-CM

## 2021-04-26 PROCEDURE — 99213 OFFICE O/P EST LOW 20 MIN: CPT | Performed by: NEUROLOGICAL SURGERY

## 2021-04-26 PROCEDURE — 3078F DIAST BP <80 MM HG: CPT | Performed by: NEUROLOGICAL SURGERY

## 2021-04-26 PROCEDURE — 1111F DSCHRG MED/CURRENT MED MERGE: CPT | Performed by: NEUROLOGICAL SURGERY

## 2021-04-26 PROCEDURE — 3008F BODY MASS INDEX DOCD: CPT | Performed by: NEUROLOGICAL SURGERY

## 2021-04-26 PROCEDURE — 3074F SYST BP LT 130 MM HG: CPT | Performed by: NEUROLOGICAL SURGERY

## 2021-04-26 RX ORDER — CYCLOBENZAPRINE HCL 10 MG
10 TABLET ORAL 3 TIMES DAILY PRN
Qty: 60 TABLET | Refills: 1 | Status: SHIPPED | OUTPATIENT
Start: 2021-04-26

## 2021-04-26 NOTE — PROGRESS NOTES
Neurosurgery Clinic Visit  2021    Rolan Parmar PCP:  Atul Iglesias MD    1955 MRN [id number]     HISTORY OF PRESENT ILLNESS:  Rolan Parmar is a(n) 77year old female here for follow-up regarding neck pain  She went to the hospital on Fr

## 2021-04-26 NOTE — PAYOR COMM NOTE
--------------  ADMISSION REVIEW     Payor: [employer] MED[employer] ADV PPO  Subscriber #:  [id number]  Authorization Number: [id number]    Admit date: 4/23/21  Admit time:  7:36 PM       Admitting Physician: Carol Mcdermott MD  Attending Physician:  No att. provide Diagnosis Date   • Blood in the stool    • Calculus of kidney    • Gout    • Herniated disc    • High cholesterol    • Night sweats    • Pneumonia due to organism 2013   • Visual impairment     reading              Past Surgical History:   Procedure Late dysmetria or pronator drift. Upper extremity motor strength biceps triceps finger and wrist extension are 5 over 5 equal and symmetrical.  Patient has good radial pulses noted bilaterally.     Lower extremity muscle strength quadriceps hamstrings dorsiflex narrowing.    C5-C6: Central disc bulge/osteophyte complex. Tilmon Kirk is also bilateral uncovertebral joint hypertrophy.  There is mild central canal narrowing and mild bilateral neural foraminal narrowing   C6-C7: Minimal disc bulge/osteophyte complex.  Uncov Reviewed: 4/9/2021        ICD-10-CM Noted POA    * (Principal) Neck pain M54.2 4/23/2021 Unknown                         Signed by Latonya Dutton MD on 4/23/2021  6:22 PM            H&P - H&P Note      H&P signed by Salinas Wood DO at 4/23/2021  5:07 Past Medical History:  Past Medical History:   Diagnosis Date   • Blood in the stool    • Calculus of kidney    • Gout    • Herniated disc    • High cholesterol    • Night sweats    • Pneumonia due to organism 2013   • Visual impairment     reading Equal pulses. Chest and Back: No tenderness or deformity. Abdomen: Soft, nontender, nondistended. Positive bowel sounds. No rebound, guarding or organomegaly. Neurologic: No focal neurological deficits. Musculoskeletal: Moves all extremities.   Extre Consultation:  Suspected discitis C spine     History of Present Illness:  Sparkle Gonzalez is a a(n) 77year old female with h/o recurrent pna and a chronic RLL infiltrate for which underwent bronch in the past which was unrevealing, here with abnl MRI of biopsy involved area and send for cx and path but d/w IR, would not be able to sample this area safely.    - recheck CXR to follow RLL infiltrate- may need to f/u with pulm again as she is now c/o some LUNDBERG  - her clinical presentation and labs do not seem c PM Status: Signed    : Alex Gregg MD (Physician)         [facility]  Discharge Summary    Debra Zhang Patient Status:  Inpatient    1955 MRN [id number]   [facility] 3SW-A Attending Alex Gregg MD   [facility] Day # 1 [facility] specialist in hospital.  Spine specialist Dr. Mitesh Harding advised that patient does not need any acute surgical intervention and advise further management per infectious disease specialist.  CRP normal at 0.82.     Seen by infectious disease specialist who advi airspace disease the right lung base with minimal residual streaky linear opacity likely represents scarring or subsegmental atelectasis.    Patient being discharged today per spine specialist and ID with outpatient follow-up with them, follow-up with regul tablet  Refills: 0        CONTINUE taking these medications      Instructions Prescription details   acetaminophen 500 MG Tabs  Commonly known as: TYLENOL EXTRA STRENGTH      Take 500 mg by mouth every 6 (six) hours as needed for Pain or Fever.    Refills: the same campus as [facility]. Please enter the campus from [facility]. You may park in the [place] or [address].&nbsp; Enter through the door that says Main Entrance and check in with [facility].&nbsp;  The phone number for this location is ( bruit, no JVD. Mild trapezius spasm and patient points towards the base of the neck on the back and shoulder when asked where the pain was.   Lungs: clear to auscultation bilaterally  Heart: S1, S2 normal, no murmur,  regular rate and rhythm  Abdomen: soft

## 2021-04-26 NOTE — TELEPHONE ENCOUNTER
Patient was admitted into the hospital on 4/23 and was discharged on 4/24. Patient followed up with specialist she was referred to by Dr. Jovita Morrissey.  Dr. Destin Arboleda gave her a treatment plan and medications so patient is wondering if she still needs to follow up

## 2021-04-27 NOTE — TELEPHONE ENCOUNTER
Let patient know it is standard after hospital discharge to recommend follow up with your primary doctor to review results and make sure there is nothing that needs follow up. I reviewed blood work results and there is nothing concerning.  It is ok with me

## 2021-05-14 ENCOUNTER — TELEPHONE (OUTPATIENT)
Dept: CASE MANAGEMENT | Age: 66
End: 2021-05-14

## 2021-05-14 NOTE — TELEPHONE ENCOUNTER
Pt informed is eligible for 2021 Medicare Advantage Supervisit, requested a call back in a month to schedule.

## 2021-05-19 ENCOUNTER — OFFICE VISIT (OUTPATIENT)
Dept: PHYSICAL THERAPY | Age: 66
End: 2021-05-19
Attending: NEUROLOGICAL SURGERY
Payer: MEDICARE

## 2021-05-19 DIAGNOSIS — M47.22 CERVICAL SPONDYLOSIS WITH RADICULOPATHY: ICD-10-CM

## 2021-05-19 PROCEDURE — 97110 THERAPEUTIC EXERCISES: CPT

## 2021-05-19 PROCEDURE — 97161 PT EVAL LOW COMPLEX 20 MIN: CPT

## 2021-05-19 NOTE — PROGRESS NOTES
SPINE EVALUATION:   Referring Physician: Dr. Jes Walker  Diagnosis: Cervical Spine Dysfunction  Cervical spine radiculopathy     Date of Service: 5/19/2021     PATIENT SUMMARY   Rolan Parmar is a 77year old female who presents to therapy today with comp measures show global loss of all cervical spine AROM due to pain and stiffness. All motions and pain improve with PROM. There is loss of shoulder flexion and abduction A/PROM with end range impingement pain reported.   Functional deficits include but are no Complexity, TE 1      Total Timed Treatment: 10 min     Total Treatment Time: 45 min       PLAN OF CARE:    Goals: (to be met in  visits)   -Improve cervical spine rotation AROM to > 60* with little to no pain so pt can turn head w/o restrictions when oper

## 2021-05-24 ENCOUNTER — OFFICE VISIT (OUTPATIENT)
Dept: PHYSICAL THERAPY | Age: 66
End: 2021-05-24
Attending: NEUROLOGICAL SURGERY
Payer: MEDICARE

## 2021-05-24 DIAGNOSIS — M47.22 CERVICAL SPONDYLOSIS WITH RADICULOPATHY: ICD-10-CM

## 2021-05-24 PROCEDURE — 97140 MANUAL THERAPY 1/> REGIONS: CPT

## 2021-05-24 PROCEDURE — 97110 THERAPEUTIC EXERCISES: CPT

## 2021-05-24 NOTE — PROGRESS NOTES
Diagnosis: Cervical Spine Dysfunction  Cervical spine radiculopathy       Insurance Type (# Auth): [employer] 8 total expires June 30th    Treatment Number: 2 of 8 6/30/2021    Subjective: Pt states had no excessive symptoms after initial assessment.  Has biofeedback cuff 30 mmhg or > with cranial flexion so pt can maintain proper head posture w/o difficulty or limitations.  -Independent in progressive HEP     Plan: Progress HEP.        Total Timed Treatment: 45 min  Total Treatment time: 45 min  Charges: ma

## 2021-05-26 ENCOUNTER — APPOINTMENT (OUTPATIENT)
Dept: PHYSICAL THERAPY | Age: 66
End: 2021-05-26
Attending: NEUROLOGICAL SURGERY
Payer: MEDICARE

## 2021-05-27 RX ORDER — IBUPROFEN 600 MG/1
600 TABLET ORAL EVERY 6 HOURS PRN
Qty: 30 EACH | Refills: 0 | Status: SHIPPED | OUTPATIENT
Start: 2021-05-27 | End: 2021-07-26

## 2021-05-27 NOTE — TELEPHONE ENCOUNTER
Last refill: Discontinued by Inderjit George [facility]    ibuprofen 600 MG Oral Tab (Discontinued) 30 tablet 0 3/29/2021 4/23/2021    Sig - Route:  Take 1 tablet (600 mg total) by mouth every 6 (six) hours as needed for Pain. - Oral      LOV:   3/23/2021 Dr Pia Valladares

## 2021-06-02 ENCOUNTER — OFFICE VISIT (OUTPATIENT)
Dept: PHYSICAL THERAPY | Age: 66
End: 2021-06-02
Attending: NEUROLOGICAL SURGERY
Payer: MEDICARE

## 2021-06-02 PROCEDURE — 97110 THERAPEUTIC EXERCISES: CPT

## 2021-06-02 PROCEDURE — 97140 MANUAL THERAPY 1/> REGIONS: CPT

## 2021-06-02 NOTE — PROGRESS NOTES
Diagnosis: Cervical Spine Dysfunction  Cervical spine radiculopathy       Insurance Type (# Auth): [employer] 8 total expires June 30th    Treatment Number: 2 of 8 6/30/2021    Subjective: Pt had t miss last session due to migraine which she has a hx of. little to no pain so pt can turn head w/o restrictions when operating her car.   -Improve cervical spine extension to > 45* so pt can look up into overhead cabinets and shelves w/o restrictions.  -Improve L and R shoulder flexion to > 160* each so pt can r

## 2021-06-07 ENCOUNTER — OFFICE VISIT (OUTPATIENT)
Dept: PHYSICAL THERAPY | Age: 66
End: 2021-06-07
Attending: NEUROLOGICAL SURGERY
Payer: MEDICARE

## 2021-06-07 PROCEDURE — 97110 THERAPEUTIC EXERCISES: CPT

## 2021-06-07 PROCEDURE — 97140 MANUAL THERAPY 1/> REGIONS: CPT

## 2021-06-07 NOTE — PROGRESS NOTES
Diagnosis: Cervical Spine Dysfunction  Cervical spine radiculopathy       Insurance Type (# Auth): [employer] 8 total expires June 30th    Treatment Number: 4 of 8 6/30/2021    Subjective: Pt with little to no neck pain since last seen until last evening scapular strength/ cervical stability strength (goal 4). Current HEP and tx addressing all goals.        Goals: (to be met in  visits)   -Improve cervical spine rotation AROM to > 60* with little to no pain so pt can turn head w/o restrictions when ope

## 2021-06-09 ENCOUNTER — OFFICE VISIT (OUTPATIENT)
Dept: PHYSICAL THERAPY | Age: 66
End: 2021-06-09
Attending: NEUROLOGICAL SURGERY
Payer: MEDICARE

## 2021-06-09 PROCEDURE — 97110 THERAPEUTIC EXERCISES: CPT

## 2021-06-09 PROCEDURE — 97140 MANUAL THERAPY 1/> REGIONS: CPT

## 2021-06-09 NOTE — PROGRESS NOTES
Diagnosis: Cervical Spine Dysfunction  Cervical spine radiculopathy       Insurance Type (# Auth): [employer] 8 total expires June 30th    Treatment Number: 5 of 8 6/30/2021    Subjective: Overall doing better.   Had stiffness at R neck this morning after change TV watching, to avoid before bed. Pt shuld have good outcome if becomes more consistent HEP to maintain ROM progress. Pt HEP reviewed, some corrections needed,  (goal 4). Current HEP and tx addressing all goals.        Goals: (to be met in  vi

## 2021-06-15 ENCOUNTER — OFFICE VISIT (OUTPATIENT)
Dept: PHYSICAL THERAPY | Age: 66
End: 2021-06-15
Attending: NEUROLOGICAL SURGERY
Payer: MEDICARE

## 2021-06-15 PROCEDURE — 97110 THERAPEUTIC EXERCISES: CPT

## 2021-06-15 PROCEDURE — 97140 MANUAL THERAPY 1/> REGIONS: CPT

## 2021-06-15 NOTE — PROGRESS NOTES
Diagnosis: Cervical Spine Dysfunction  Cervical spine radiculopathy       Insurance Type (# Auth): [employer] 8 total expires June 30th    Treatment Number: 6 of 8 6/30/2021    Subjective: Pt making adjustments to sleeping posture and positioning due to reviewed, some corrections needed.  (goal 4). That program was progressed as noted above to improve tissue mobility and ease of rasing arms overhead, addressing goal 3. Current HEP and tx addressing all goals. Pt meeting goals 1,2 at this time.

## 2021-06-17 ENCOUNTER — OFFICE VISIT (OUTPATIENT)
Dept: PHYSICAL THERAPY | Age: 66
End: 2021-06-17
Attending: NEUROLOGICAL SURGERY
Payer: MEDICARE

## 2021-06-17 PROCEDURE — 97140 MANUAL THERAPY 1/> REGIONS: CPT

## 2021-06-17 PROCEDURE — 97110 THERAPEUTIC EXERCISES: CPT

## 2021-06-17 NOTE — PROGRESS NOTES
Diagnosis: Cervical Spine Dysfunction  Cervical spine radiculopathy       Insurance Type (# Auth): [employer] 8 total expires June 30th    Treatment Number: 7 of 8 6/30/2021    Subjective: Pt making adjustments to sleeping posture and positioning along w activities during the day. Remains with c/o minimal neck stiffness in the morning. That is well improved with sleeping posture corrections. Expect that to fully resolve if compliancy with changes made.      Pt is currently meeting all goals at this time exc

## 2021-07-26 RX ORDER — IBUPROFEN 600 MG/1
TABLET ORAL
Qty: 30 TABLET | Refills: 0 | Status: SHIPPED | OUTPATIENT
Start: 2021-07-26

## 2021-07-26 NOTE — TELEPHONE ENCOUNTER
No protocol     Requesting: Ibuprofen 600mg     LOV: 3/23/21   RTC: none noted      Last Refill: 5/27/21 #30 0 refills     Appointments for Next 30 Days 7/26/2021 - 8/25/2021      Date Arrival Time Visit Type Length Department Provider     7/29/2021  2:00

## 2021-07-29 ENCOUNTER — OFFICE VISIT (OUTPATIENT)
Dept: INTERNAL MEDICINE CLINIC | Facility: CLINIC | Age: 66
End: 2021-07-29
Payer: MEDICARE

## 2021-07-29 VITALS
DIASTOLIC BLOOD PRESSURE: 69 MMHG | HEIGHT: 64.5 IN | HEART RATE: 104 BPM | WEIGHT: 171.38 LBS | OXYGEN SATURATION: 95 % | BODY MASS INDEX: 28.9 KG/M2 | TEMPERATURE: 98 F | SYSTOLIC BLOOD PRESSURE: 115 MMHG | RESPIRATION RATE: 16 BRPM

## 2021-07-29 DIAGNOSIS — Z78.0 POSTMENOPAUSAL ESTROGEN DEFICIENCY: ICD-10-CM

## 2021-07-29 DIAGNOSIS — Z00.00 WELLNESS EXAMINATION: Primary | ICD-10-CM

## 2021-07-29 DIAGNOSIS — L90.0 LICHEN SCLEROSUS: ICD-10-CM

## 2021-07-29 DIAGNOSIS — G47.00 INSOMNIA, UNSPECIFIED TYPE: ICD-10-CM

## 2021-07-29 DIAGNOSIS — R73.03 PRE-DIABETES: ICD-10-CM

## 2021-07-29 DIAGNOSIS — Z12.31 ENCOUNTER FOR SCREENING MAMMOGRAM FOR BREAST CANCER: ICD-10-CM

## 2021-07-29 PROBLEM — J41.0 SMOKERS' COUGH (HCC): Chronic | Status: ACTIVE | Noted: 2021-07-29

## 2021-07-29 LAB
CARTRIDGE LOT#: NORMAL NUMERIC
HEMOGLOBIN A1C: 5.6 % (ref 4.3–5.6)

## 2021-07-29 PROCEDURE — 3008F BODY MASS INDEX DOCD: CPT | Performed by: INTERNAL MEDICINE

## 2021-07-29 PROCEDURE — 99397 PER PM REEVAL EST PAT 65+ YR: CPT | Performed by: INTERNAL MEDICINE

## 2021-07-29 PROCEDURE — 3074F SYST BP LT 130 MM HG: CPT | Performed by: INTERNAL MEDICINE

## 2021-07-29 PROCEDURE — G0439 PPPS, SUBSEQ VISIT: HCPCS | Performed by: INTERNAL MEDICINE

## 2021-07-29 PROCEDURE — 96160 PT-FOCUSED HLTH RISK ASSMT: CPT | Performed by: INTERNAL MEDICINE

## 2021-07-29 PROCEDURE — 3078F DIAST BP <80 MM HG: CPT | Performed by: INTERNAL MEDICINE

## 2021-07-29 PROCEDURE — 83036 HEMOGLOBIN GLYCOSYLATED A1C: CPT | Performed by: INTERNAL MEDICINE

## 2021-07-29 RX ORDER — TRAZODONE HYDROCHLORIDE 50 MG/1
50 TABLET ORAL NIGHTLY PRN
Qty: 30 TABLET | Refills: 0 | Status: SHIPPED | OUTPATIENT
Start: 2021-07-29

## 2021-07-29 RX ORDER — KETOCONAZOLE 20 MG/ML
SHAMPOO TOPICAL
COMMUNITY
Start: 2021-07-24 | End: 2021-07-29

## 2021-07-29 RX ORDER — KETOCONAZOLE 20 MG/ML
SHAMPOO TOPICAL
Qty: 120 ML | Refills: 5 | Status: SHIPPED | OUTPATIENT
Start: 2021-07-29

## 2021-07-29 NOTE — PROGRESS NOTES
REASON FOR VISIT:    Franci Nassar is a 77year old female who presents for a MA Supervisit.     mammo due 11/2021  Due for DEXA  Colonoscopy up to date - due 8/2025  Pneumovax due 8/2021      Pre-diabetes, last a1c 6.1    She notes she has trouble fallin topically 2 (two) times daily. To affected area (Patient not taking: Reported on 4/26/2021 )     • Meloxicam 15 MG Oral Tab Take 1 tablet (15 mg total) by mouth daily.  (Patient not taking: Reported on 4/26/2021 ) 30 tablet 2       Glucose and HbA1c Latest Pneumococcal?: No     Functional Ability     Bathing or Showering: Able without help  Toileting: Able without help  Dressing: Able without help  Eating: Able without help  Driving: Able without help  Preparing your meals: Able without help  Managing money/ visit.     Fecal Occult Blood Annually Occult Blood Result (no units)   Date Value   08/01/2015 Positive for Occult Blood (A)      Glaucoma Screening     Ophthalmology Visit Annually Recommended    Immunizations     Zoster (Not covered by Medicare Part B) N GENERAL: feels well otherwise  SKIN: denies any unusual skin lesions  EYES: denies blurred vision or double vision  HEENT: denies nasal congestion  LUNGS: denies shortness of breath with exertion  CARDIOVASCULAR: denies chest pain on exertion  GI: denies diet  -     HEMOGLOBIN A1C = 5.6    Insomnia, unspecified type -discussed taking trazodone 1/2 tablet as needed. Discussed side effects.      Postmenopausal estrogen deficiency -due for dexa as she has not had one in the past   -     XR DEXA BONE DENSITOMET

## 2021-08-02 ENCOUNTER — TELEPHONE (OUTPATIENT)
Dept: INTERNAL MEDICINE CLINIC | Facility: CLINIC | Age: 66
End: 2021-08-02

## 2021-08-02 DIAGNOSIS — Z23 NEED FOR VACCINATION AGAINST STREPTOCOCCUS PNEUMONIAE: Primary | ICD-10-CM

## 2021-08-02 NOTE — TELEPHONE ENCOUNTER
Pt requesting pnuemovax vaccine orders to be placed.      Confirmed [phone] as best contact for pt

## 2021-08-03 ENCOUNTER — NURSE ONLY (OUTPATIENT)
Dept: INTERNAL MEDICINE CLINIC | Facility: CLINIC | Age: 66
End: 2021-08-03
Payer: MEDICARE

## 2021-08-03 PROCEDURE — G0009 ADMIN PNEUMOCOCCAL VACCINE: HCPCS | Performed by: INTERNAL MEDICINE

## 2021-08-03 PROCEDURE — 90732 PPSV23 VACC 2 YRS+ SUBQ/IM: CPT | Performed by: INTERNAL MEDICINE

## 2021-08-16 ENCOUNTER — OFFICE VISIT (OUTPATIENT)
Dept: OBGYN CLINIC | Facility: CLINIC | Age: 66
End: 2021-08-16
Payer: MEDICARE

## 2021-08-16 VITALS
HEIGHT: 63 IN | DIASTOLIC BLOOD PRESSURE: 80 MMHG | SYSTOLIC BLOOD PRESSURE: 128 MMHG | WEIGHT: 172 LBS | BODY MASS INDEX: 30.48 KG/M2 | HEART RATE: 80 BPM

## 2021-08-16 DIAGNOSIS — R30.0 DYSURIA: Primary | ICD-10-CM

## 2021-08-16 DIAGNOSIS — R10.2 VULVAR PAIN: ICD-10-CM

## 2021-08-16 DIAGNOSIS — N94.9 VAGINAL BURNING: ICD-10-CM

## 2021-08-16 PROCEDURE — 3074F SYST BP LT 130 MM HG: CPT | Performed by: OBSTETRICS & GYNECOLOGY

## 2021-08-16 PROCEDURE — 87660 TRICHOMONAS VAGIN DIR PROBE: CPT | Performed by: OBSTETRICS & GYNECOLOGY

## 2021-08-16 PROCEDURE — 87529 HSV DNA AMP PROBE: CPT | Performed by: OBSTETRICS & GYNECOLOGY

## 2021-08-16 PROCEDURE — 81002 URINALYSIS NONAUTO W/O SCOPE: CPT | Performed by: OBSTETRICS & GYNECOLOGY

## 2021-08-16 PROCEDURE — 99214 OFFICE O/P EST MOD 30 MIN: CPT | Performed by: OBSTETRICS & GYNECOLOGY

## 2021-08-16 PROCEDURE — 3079F DIAST BP 80-89 MM HG: CPT | Performed by: OBSTETRICS & GYNECOLOGY

## 2021-08-16 PROCEDURE — 3008F BODY MASS INDEX DOCD: CPT | Performed by: OBSTETRICS & GYNECOLOGY

## 2021-08-16 PROCEDURE — 87480 CANDIDA DNA DIR PROBE: CPT | Performed by: OBSTETRICS & GYNECOLOGY

## 2021-08-16 PROCEDURE — 87510 GARDNER VAG DNA DIR PROBE: CPT | Performed by: OBSTETRICS & GYNECOLOGY

## 2021-08-16 RX ORDER — CLOBETASOL PROPIONATE 0.5 MG/G
1 OINTMENT TOPICAL 2 TIMES DAILY
Qty: 15 G | Refills: 1 | Status: SHIPPED | OUTPATIENT
Start: 2021-08-16

## 2021-08-16 NOTE — PROGRESS NOTES
Chris Casas is a 77year old female. HPI:   Patient presents with: Other: Rash  Patient presents with severe itching and pain of the vulva to the point that she cannot sit.   Patient states that she has not been using the clobetasol due to running o apparent distress  ABDOMEN: Soft, non distended, non tender, no masses  GYNE/:   External Genitalia: Significant whitish discoloration of both labia.   Patient very uncomfortable and not able to to do an adequate exam.  Spreading the labia slightly there

## 2021-08-16 NOTE — PROGRESS NOTES
Pt here for rash on her buttocks. Pt states there is a lot of itching. She was unable to sleep. Pt also is having burning upon urination.

## 2021-08-18 LAB
APPEARANCE: YELLOW
BILIRUBIN: NEGATIVE
GLUCOSE (URINE DIPSTICK): NEGATIVE MG/DL
KETONES (URINE DIPSTICK): NEGATIVE MG/DL
LEUKOCYTES: NEGATIVE
MULTISTIX LOT#: 5077 NUMERIC
NITRITE, URINE: NEGATIVE
OCCULT BLOOD: NEGATIVE
PH, URINE: 5.5 (ref 4.5–8)
PROTEIN (URINE DIPSTICK): NEGATIVE MG/DL
SPECIFIC GRAVITY: 1.02 (ref 1–1.03)
UROBILINOGEN,SEMI-QN: 0.2 MG/DL (ref 0–1.9)

## 2021-08-18 NOTE — PROGRESS NOTES
Patient aware of vaginal swab results. She is still having a lot of pain in her vaginal area from the rash and pressure when she has to urinate. States that the itching is better. Has been using the clobetasol and Desitin cream to help relieve symptoms.  Nkechi

## 2021-08-18 NOTE — PROGRESS NOTES
Please see previous encounter. Would you like patient to come back in 1 week for follow up of rash and continued symptoms? Thanks.

## 2021-08-19 LAB — HERPES SIMPLEX VIRUS BY PCR: NOT DETECTED

## 2021-08-23 NOTE — PROGRESS NOTES
Edgardo Simpson,   Your viral culture results are normal. I encourage you to learn more about each test by accessing the great resources available through [employer].  Click on the test name if you would like to see a description of the test. I hope this informatio

## 2021-08-24 NOTE — PROGRESS NOTES
Patient aware of vaginal swab results. Patient states that the exterior skin is better, but she is still having a lot of discomfort inside her vagina. She is wondering if there is something she can use for the internal part to help with the discomfort.  Rou

## 2021-09-01 ENCOUNTER — OFFICE VISIT (OUTPATIENT)
Dept: OBGYN CLINIC | Facility: CLINIC | Age: 66
End: 2021-09-01
Payer: MEDICARE

## 2021-09-01 VITALS
RESPIRATION RATE: 12 BRPM | TEMPERATURE: 98 F | BODY MASS INDEX: 28.5 KG/M2 | HEIGHT: 64.5 IN | DIASTOLIC BLOOD PRESSURE: 78 MMHG | WEIGHT: 169 LBS | SYSTOLIC BLOOD PRESSURE: 122 MMHG | HEART RATE: 89 BPM

## 2021-09-01 DIAGNOSIS — L28.0 LICHENOID DERMATITIS: Primary | ICD-10-CM

## 2021-09-01 PROCEDURE — 99213 OFFICE O/P EST LOW 20 MIN: CPT | Performed by: OBSTETRICS & GYNECOLOGY

## 2021-09-01 PROCEDURE — 3078F DIAST BP <80 MM HG: CPT | Performed by: OBSTETRICS & GYNECOLOGY

## 2021-09-01 PROCEDURE — 3008F BODY MASS INDEX DOCD: CPT | Performed by: OBSTETRICS & GYNECOLOGY

## 2021-09-01 PROCEDURE — 3074F SYST BP LT 130 MM HG: CPT | Performed by: OBSTETRICS & GYNECOLOGY

## 2021-09-01 NOTE — PROGRESS NOTES
Spoke with patient. States the outside skin is better, but she is still having discomfort on the inside of her vaginal area. She is able to urinate without any discomfort. Patient had f/u appointment today.

## 2021-09-01 NOTE — PROGRESS NOTES
Pt here for follow up to rash. It is clearing up but some of the rash . She is still having some issues in the vagina.

## 2021-09-01 NOTE — PROGRESS NOTES
Ilda Haley is a 77year old female. HPI:   Patient presents with: Other: Follow up to rash  Patient had severe itching and pain of the vulva. States that symptoms have improved significantly. Has been using the clobetasol.   Previous biopsy showe times per week. Avoid harsh soaps.   Use Desitin for skin protection             9/1/2021  Terrie Rocha MD

## 2021-11-09 ENCOUNTER — HOSPITAL ENCOUNTER (OUTPATIENT)
Dept: MAMMOGRAPHY | Age: 66
Discharge: HOME | End: 2021-11-09
Attending: INTERNAL MEDICINE
Payer: MEDICARE

## 2021-11-09 ENCOUNTER — HOSPITAL ENCOUNTER (OUTPATIENT)
Dept: BONE DENSITY | Age: 66
Discharge: HOME | End: 2021-11-09
Attending: INTERNAL MEDICINE
Payer: MEDICARE

## 2021-11-09 DIAGNOSIS — Z12.31 ENCOUNTER FOR SCREENING MAMMOGRAM FOR BREAST CANCER: ICD-10-CM

## 2021-11-09 DIAGNOSIS — Z78.0 POSTMENOPAUSAL ESTROGEN DEFICIENCY: ICD-10-CM

## 2021-11-09 PROCEDURE — 77080 DXA BONE DENSITY AXIAL: CPT | Performed by: INTERNAL MEDICINE

## 2021-12-08 ENCOUNTER — OFFICE VISIT (OUTPATIENT)
Dept: INTERNAL MEDICINE CLINIC | Facility: CLINIC | Age: 66
End: 2021-12-08
Payer: MEDICARE

## 2021-12-08 ENCOUNTER — LAB ENCOUNTER (OUTPATIENT)
Dept: LAB | Age: 66
End: 2021-12-08
Attending: INTERNAL MEDICINE
Payer: MEDICARE

## 2021-12-08 VITALS
OXYGEN SATURATION: 98 % | SYSTOLIC BLOOD PRESSURE: 110 MMHG | TEMPERATURE: 98 F | HEIGHT: 64.5 IN | DIASTOLIC BLOOD PRESSURE: 68 MMHG | BODY MASS INDEX: 29.24 KG/M2 | RESPIRATION RATE: 16 BRPM | HEART RATE: 78 BPM | WEIGHT: 173.38 LBS

## 2021-12-08 DIAGNOSIS — R73.03 PRE-DIABETES: ICD-10-CM

## 2021-12-08 DIAGNOSIS — M81.0 AGE-RELATED OSTEOPOROSIS WITHOUT CURRENT PATHOLOGICAL FRACTURE: Primary | ICD-10-CM

## 2021-12-08 DIAGNOSIS — L90.0 LICHEN SCLEROSUS: ICD-10-CM

## 2021-12-08 DIAGNOSIS — M81.0 AGE-RELATED OSTEOPOROSIS WITHOUT CURRENT PATHOLOGICAL FRACTURE: ICD-10-CM

## 2021-12-08 PROCEDURE — 36415 COLL VENOUS BLD VENIPUNCTURE: CPT

## 2021-12-08 PROCEDURE — 3078F DIAST BP <80 MM HG: CPT | Performed by: INTERNAL MEDICINE

## 2021-12-08 PROCEDURE — 83036 HEMOGLOBIN GLYCOSYLATED A1C: CPT

## 2021-12-08 PROCEDURE — 99213 OFFICE O/P EST LOW 20 MIN: CPT | Performed by: INTERNAL MEDICINE

## 2021-12-08 PROCEDURE — 3074F SYST BP LT 130 MM HG: CPT | Performed by: INTERNAL MEDICINE

## 2021-12-08 PROCEDURE — 3008F BODY MASS INDEX DOCD: CPT | Performed by: INTERNAL MEDICINE

## 2021-12-08 PROCEDURE — 82306 VITAMIN D 25 HYDROXY: CPT

## 2021-12-08 RX ORDER — ALENDRONATE SODIUM 70 MG/1
70 TABLET ORAL
Qty: 12 TABLET | Refills: 3 | Status: SHIPPED | OUTPATIENT
Start: 2021-12-08

## 2021-12-08 RX ORDER — DESOXIMETASONE 2.5 MG/G
1 CREAM TOPICAL 2 TIMES DAILY
Qty: 15 G | Refills: 0 | Status: SHIPPED | OUTPATIENT
Start: 2021-12-08

## 2021-12-08 NOTE — PROGRESS NOTES
[facility] Internal Medicine Office Note  Chief Complaint:   Patient presents with:  Test Results      HPI:   This is a 74year old female coming in for DEXA scan results - osteoporosis   HPI    New diagnosis of osteoporosis from DEXA scan     Sh total) by mouth nightly as needed for Sleep.  30 tablet 0   • IBUPROFEN 600 MG Oral Tab TAKE 1 TABLET(600 MG) BY MOUTH EVERY 6 HOURS AS NEEDED FOR PAIN 30 tablet 0   • acetaminophen 500 MG Oral Tab Take 500 mg by mouth every 6 (six) hours as needed for Pain Neurological:      General: No focal deficit present. Mental Status: She is alert.    Psychiatric:         Mood and Affect: Mood normal.          ASSESSMENT AND PLAN:   Kaley Bonilla is a 74year old female with  Age-related osteoporosis without cu

## 2021-12-09 PROBLEM — M81.0 AGE-RELATED OSTEOPOROSIS WITHOUT CURRENT PATHOLOGICAL FRACTURE: Status: ACTIVE | Noted: 2021-12-09

## 2021-12-09 RX ORDER — ERGOCALCIFEROL 1.25 MG/1
50000 CAPSULE ORAL WEEKLY
Qty: 12 CAPSULE | Refills: 0 | Status: SHIPPED | OUTPATIENT
Start: 2021-12-09 | End: 2022-03-09

## 2021-12-23 ENCOUNTER — HOSPITAL ENCOUNTER (OUTPATIENT)
Dept: MAMMOGRAPHY | Age: 66
Discharge: HOME | End: 2021-12-23
Attending: INTERNAL MEDICINE
Payer: MEDICARE

## 2021-12-23 PROCEDURE — 77067 SCR MAMMO BI INCL CAD: CPT | Performed by: INTERNAL MEDICINE

## 2021-12-23 PROCEDURE — 77063 BREAST TOMOSYNTHESIS BI: CPT | Performed by: INTERNAL MEDICINE

## 2022-03-09 RX ORDER — DESOXIMETASONE 2.5 MG/G
CREAM TOPICAL
Qty: 15 G | Refills: 0 | Status: SHIPPED | OUTPATIENT
Start: 2022-03-09

## 2022-03-09 NOTE — TELEPHONE ENCOUNTER
LOV: 12/8/2021 with Dr. Christy Lung  RTC: no follow-up on file  Last Relevant Labs: 12/8/2021  Filled: 12/8/2021    #15 g with 0 refills    Future Appointments   Date Time Provider Uzair Adornoi   4/13/2022 10:00 AM Val Amaya MD [facility] [place]

## 2022-04-16 ENCOUNTER — APPOINTMENT (OUTPATIENT)
Dept: ULTRASOUND IMAGING | Facility: HOSPITAL | Age: 67
End: 2022-04-16
Attending: EMERGENCY MEDICINE
Payer: MEDICARE

## 2022-04-16 ENCOUNTER — HOSPITAL ENCOUNTER (EMERGENCY)
Facility: HOSPITAL | Age: 67
Discharge: HOME | End: 2022-04-16
Attending: EMERGENCY MEDICINE
Payer: MEDICARE

## 2022-04-16 VITALS
DIASTOLIC BLOOD PRESSURE: 83 MMHG | BODY MASS INDEX: 30.73 KG/M2 | SYSTOLIC BLOOD PRESSURE: 146 MMHG | WEIGHT: 180 LBS | OXYGEN SATURATION: 93 % | HEIGHT: 64 IN | RESPIRATION RATE: 18 BRPM | TEMPERATURE: 97 F | HEART RATE: 83 BPM

## 2022-04-16 DIAGNOSIS — N30.00 ACUTE CYSTITIS WITHOUT HEMATURIA: ICD-10-CM

## 2022-04-16 DIAGNOSIS — R59.0 INGUINAL LYMPHADENOPATHY: Primary | ICD-10-CM

## 2022-04-16 LAB
ALBUMIN SERPL-MCNC: 3.7 G/DL (ref 3.4–5)
ALBUMIN/GLOB SERPL: 0.9 {RATIO} (ref 1–2)
ALP LIVER SERPL-CCNC: 73 U/L
ALT SERPL-CCNC: 22 U/L
ANION GAP SERPL CALC-SCNC: 3 MMOL/L (ref 0–18)
AST SERPL-CCNC: 25 U/L (ref 15–37)
BASOPHILS # BLD AUTO: 0.01 X10(3) UL (ref 0–0.2)
BASOPHILS NFR BLD AUTO: 0.2 %
BILIRUB SERPL-MCNC: 0.2 MG/DL (ref 0.1–2)
BILIRUB UR QL STRIP.AUTO: NEGATIVE
BUN BLD-MCNC: 18 MG/DL (ref 7–18)
CALCIUM BLD-MCNC: 9 MG/DL (ref 8.5–10.1)
CHLORIDE SERPL-SCNC: 109 MMOL/L (ref 98–112)
CO2 SERPL-SCNC: 27 MMOL/L (ref 21–32)
COLOR UR AUTO: YELLOW
CREAT BLD-MCNC: 0.82 MG/DL
EOSINOPHIL # BLD AUTO: 0.07 X10(3) UL (ref 0–0.7)
EOSINOPHIL NFR BLD AUTO: 1.3 %
ERYTHROCYTE [DISTWIDTH] IN BLOOD BY AUTOMATED COUNT: 13.1 %
GLOBULIN PLAS-MCNC: 3.9 G/DL (ref 2.8–4.4)
GLUCOSE BLD-MCNC: 99 MG/DL (ref 70–99)
GLUCOSE UR STRIP.AUTO-MCNC: NEGATIVE MG/DL
HCT VFR BLD AUTO: 37.4 %
HGB BLD-MCNC: 12.6 G/DL
HYALINE CASTS #/AREA URNS AUTO: PRESENT /LPF
IMM GRANULOCYTES # BLD AUTO: 0.01 X10(3) UL (ref 0–1)
IMM GRANULOCYTES NFR BLD: 0.2 %
LYMPHOCYTES # BLD AUTO: 1.8 X10(3) UL (ref 1–4)
LYMPHOCYTES NFR BLD AUTO: 34.4 %
MCH RBC QN AUTO: 32.1 PG (ref 26–34)
MCHC RBC AUTO-ENTMCNC: 33.7 G/DL (ref 31–37)
MCV RBC AUTO: 95.2 FL
MONOCYTES # BLD AUTO: 0.45 X10(3) UL (ref 0.1–1)
MONOCYTES NFR BLD AUTO: 8.6 %
NEUTROPHILS # BLD AUTO: 2.89 X10 (3) UL (ref 1.5–7.7)
NEUTROPHILS # BLD AUTO: 2.89 X10(3) UL (ref 1.5–7.7)
NEUTROPHILS NFR BLD AUTO: 55.3 %
NITRITE UR QL STRIP.AUTO: POSITIVE
OSMOLALITY SERPL CALC.SUM OF ELEC: 290 MOSM/KG (ref 275–295)
PH UR STRIP.AUTO: 5 [PH] (ref 5–8)
PLATELET # BLD AUTO: 255 10(3)UL (ref 150–450)
POTASSIUM SERPL-SCNC: 4.1 MMOL/L (ref 3.5–5.1)
PROT SERPL-MCNC: 7.6 G/DL (ref 6.4–8.2)
PROT UR STRIP.AUTO-MCNC: NEGATIVE MG/DL
RBC # BLD AUTO: 3.93 X10(6)UL
RBC UR QL AUTO: NEGATIVE
SODIUM SERPL-SCNC: 139 MMOL/L (ref 136–145)
SP GR UR STRIP.AUTO: 1.03 (ref 1–1.03)
UROBILINOGEN UR STRIP.AUTO-MCNC: <2 MG/DL
WBC # BLD AUTO: 5.2 X10(3) UL (ref 4–11)

## 2022-04-16 PROCEDURE — 99284 EMERGENCY DEPT VISIT MOD MDM: CPT

## 2022-04-16 PROCEDURE — 87088 URINE BACTERIA CULTURE: CPT | Performed by: EMERGENCY MEDICINE

## 2022-04-16 PROCEDURE — 87086 URINE CULTURE/COLONY COUNT: CPT | Performed by: EMERGENCY MEDICINE

## 2022-04-16 PROCEDURE — 80053 COMPREHEN METABOLIC PANEL: CPT | Performed by: EMERGENCY MEDICINE

## 2022-04-16 PROCEDURE — 81001 URINALYSIS AUTO W/SCOPE: CPT | Performed by: EMERGENCY MEDICINE

## 2022-04-16 PROCEDURE — 96374 THER/PROPH/DIAG INJ IV PUSH: CPT

## 2022-04-16 PROCEDURE — 76882 US LMTD JT/FCL EVL NVASC XTR: CPT | Performed by: EMERGENCY MEDICINE

## 2022-04-16 PROCEDURE — 85025 COMPLETE CBC W/AUTO DIFF WBC: CPT | Performed by: EMERGENCY MEDICINE

## 2022-04-16 RX ORDER — CEPHALEXIN 500 MG/1
1000 CAPSULE ORAL ONCE
Status: COMPLETED | OUTPATIENT
Start: 2022-04-16 | End: 2022-04-16

## 2022-04-16 RX ORDER — KETOROLAC TROMETHAMINE 30 MG/ML
15 INJECTION, SOLUTION INTRAMUSCULAR; INTRAVENOUS ONCE
Status: COMPLETED | OUTPATIENT
Start: 2022-04-16 | End: 2022-04-16

## 2022-04-16 RX ORDER — CEFADROXIL 500 MG/1
500 CAPSULE ORAL 2 TIMES DAILY
Qty: 14 CAPSULE | Refills: 0 | Status: SHIPPED | OUTPATIENT
Start: 2022-04-16 | End: 2022-04-23

## 2022-04-16 NOTE — ED INITIAL ASSESSMENT (HPI)
Pt arrives ambulatory to triage, states she's been having L groin pain that radiates down to L leg x1 week. Pt states OTC pain meds have no relief anymore. A&O x4.

## 2022-04-18 ENCOUNTER — TELEPHONE (OUTPATIENT)
Dept: INTERNAL MEDICINE CLINIC | Facility: CLINIC | Age: 67
End: 2022-04-18

## 2022-04-18 NOTE — TELEPHONE ENCOUNTER
Pt scheduled for f/u and informed that LS can discuss further recommendations at [address]      Future Appointments   Date Time Provider Uzair Gayatri   4/27/2022  9:45 AM Satish Levine MD [facility] [place]

## 2022-04-18 NOTE — TELEPHONE ENCOUNTER
Could add in on 4/27/22 at 9:30am for 15 min appt          (Review of ED notes: inguinal lymphadenopathy needs follow up and she was treated for UTI )

## 2022-04-18 NOTE — TELEPHONE ENCOUNTER
Pt seen in ED 4/16/22 for groin pain. No immediate openings in Dr. Iris Orozco schedule for fol/up. Please review ED notes, imaging result and advise. Overbook schedule ? Additional imaging ? Patient declined OV with a different provider at this time.

## 2022-04-27 ENCOUNTER — OFFICE VISIT (OUTPATIENT)
Dept: INTERNAL MEDICINE CLINIC | Facility: CLINIC | Age: 67
End: 2022-04-27
Payer: MEDICARE

## 2022-04-27 ENCOUNTER — LAB ENCOUNTER (OUTPATIENT)
Dept: LAB | Age: 67
End: 2022-04-27
Attending: INTERNAL MEDICINE
Payer: MEDICARE

## 2022-04-27 VITALS
DIASTOLIC BLOOD PRESSURE: 66 MMHG | OXYGEN SATURATION: 99 % | BODY MASS INDEX: 29.64 KG/M2 | RESPIRATION RATE: 16 BRPM | HEART RATE: 88 BPM | SYSTOLIC BLOOD PRESSURE: 90 MMHG | HEIGHT: 64 IN | TEMPERATURE: 98 F | WEIGHT: 173.63 LBS

## 2022-04-27 DIAGNOSIS — E55.9 HYPOVITAMINOSIS D: ICD-10-CM

## 2022-04-27 DIAGNOSIS — R59.0 INGUINAL LYMPHADENOPATHY: Primary | ICD-10-CM

## 2022-04-27 DIAGNOSIS — M81.0 AGE-RELATED OSTEOPOROSIS WITHOUT CURRENT PATHOLOGICAL FRACTURE: ICD-10-CM

## 2022-04-27 DIAGNOSIS — R73.03 PRE-DIABETES: ICD-10-CM

## 2022-04-27 LAB
EST. AVERAGE GLUCOSE BLD GHB EST-MCNC: 126 MG/DL (ref 68–126)
HBA1C MFR BLD: 6 % (ref ?–5.7)
VIT D+METAB SERPL-MCNC: 33.4 NG/ML (ref 30–100)

## 2022-04-27 PROCEDURE — 3078F DIAST BP <80 MM HG: CPT | Performed by: INTERNAL MEDICINE

## 2022-04-27 PROCEDURE — 1111F DSCHRG MED/CURRENT MED MERGE: CPT | Performed by: INTERNAL MEDICINE

## 2022-04-27 PROCEDURE — 3008F BODY MASS INDEX DOCD: CPT | Performed by: INTERNAL MEDICINE

## 2022-04-27 PROCEDURE — 99214 OFFICE O/P EST MOD 30 MIN: CPT | Performed by: INTERNAL MEDICINE

## 2022-04-27 PROCEDURE — 36415 COLL VENOUS BLD VENIPUNCTURE: CPT

## 2022-04-27 PROCEDURE — 3074F SYST BP LT 130 MM HG: CPT | Performed by: INTERNAL MEDICINE

## 2022-04-27 PROCEDURE — 82306 VITAMIN D 25 HYDROXY: CPT

## 2022-04-27 PROCEDURE — 83036 HEMOGLOBIN GLYCOSYLATED A1C: CPT

## 2022-04-27 RX ORDER — ALENDRONATE SODIUM 70 MG/1
70 TABLET ORAL
Qty: 12 TABLET | Refills: 3 | Status: SHIPPED | OUTPATIENT
Start: 2022-04-27

## 2022-05-18 DIAGNOSIS — L90.0 LICHEN SCLEROSUS: ICD-10-CM

## 2022-05-18 RX ORDER — DESOXIMETASONE 2.5 MG/G
CREAM TOPICAL
Qty: 15 G | Refills: 3 | Status: SHIPPED | OUTPATIENT
Start: 2022-05-18

## 2022-05-18 NOTE — TELEPHONE ENCOUNTER
LOV: 4/27/2022 with Dr. Nabila Jones  RTC: no follow-up on file  Last Relevant Labs: April 2022  Filled: 3/9/2022    #15 g with 0 refills    No future appointments.

## 2022-06-15 ENCOUNTER — TELEPHONE (OUTPATIENT)
Dept: INTERNAL MEDICINE CLINIC | Facility: CLINIC | Age: 67
End: 2022-06-15

## 2022-06-15 RX ORDER — IBUPROFEN 600 MG/1
600 TABLET ORAL EVERY 8 HOURS PRN
Qty: 20 TABLET | Refills: 0 | Status: SHIPPED | OUTPATIENT
Start: 2022-06-15

## 2022-06-15 RX ORDER — IBUPROFEN 600 MG/1
600 TABLET ORAL 3 TIMES DAILY PRN
Status: CANCELLED | OUTPATIENT
Start: 2022-06-15

## 2022-06-15 NOTE — TELEPHONE ENCOUNTER
Relayed information to patient. She has scheduled an ultrasound for the end of the month. Verbalized understanding. Order pended, please sign.

## 2022-06-15 NOTE — TELEPHONE ENCOUNTER
Patient calling to follow up. Informed we are still waiting on recommendations from Dr. Radha Patel. Informed patient that there is an order for US that was placed. Transferred patient to central scheduling to schedule US. Patient would like to know if doctor can prescribe something for pain.

## 2022-06-15 NOTE — TELEPHONE ENCOUNTER
She can take ibuprofen 600mg three times a day and take with food or schedule video visit appointment to further discuss.   Recommend repeat ultrasound    Ok to add in at the end of the day today

## 2022-06-22 ENCOUNTER — APPOINTMENT (OUTPATIENT)
Dept: ULTRASOUND IMAGING | Facility: HOSPITAL | Age: 67
End: 2022-06-22
Attending: EMERGENCY MEDICINE
Payer: MEDICARE

## 2022-06-22 ENCOUNTER — HOSPITAL ENCOUNTER (EMERGENCY)
Facility: HOSPITAL | Age: 67
Discharge: HOME | End: 2022-06-22
Attending: EMERGENCY MEDICINE
Payer: MEDICARE

## 2022-06-22 ENCOUNTER — APPOINTMENT (OUTPATIENT)
Dept: GENERAL RADIOLOGY | Facility: HOSPITAL | Age: 67
End: 2022-06-22
Attending: EMERGENCY MEDICINE
Payer: MEDICARE

## 2022-06-22 VITALS
SYSTOLIC BLOOD PRESSURE: 124 MMHG | RESPIRATION RATE: 18 BRPM | BODY MASS INDEX: 27.31 KG/M2 | WEIGHT: 160 LBS | TEMPERATURE: 98 F | OXYGEN SATURATION: 99 % | DIASTOLIC BLOOD PRESSURE: 76 MMHG | HEART RATE: 88 BPM | HEIGHT: 64 IN

## 2022-06-22 DIAGNOSIS — G57.80 COMPRESSION NEUROPATHY OF ILIOINGUINAL NERVE: Primary | ICD-10-CM

## 2022-06-22 DIAGNOSIS — R59.1 LYMPHADENOPATHY: ICD-10-CM

## 2022-06-22 LAB
BILIRUB UR QL STRIP.AUTO: NEGATIVE
CLARITY UR REFRACT.AUTO: CLEAR
COLOR UR AUTO: YELLOW
GLUCOSE UR STRIP.AUTO-MCNC: NEGATIVE MG/DL
KETONES UR STRIP.AUTO-MCNC: NEGATIVE MG/DL
LEUKOCYTE ESTERASE UR QL STRIP.AUTO: NEGATIVE
NITRITE UR QL STRIP.AUTO: NEGATIVE
PH UR STRIP.AUTO: 6.5 [PH] (ref 5–8)
PROT UR STRIP.AUTO-MCNC: NEGATIVE MG/DL
RBC UR QL AUTO: NEGATIVE
SP GR UR STRIP.AUTO: 1.02 (ref 1–1.03)
UROBILINOGEN UR STRIP.AUTO-MCNC: 0.2 MG/DL

## 2022-06-22 PROCEDURE — 81003 URINALYSIS AUTO W/O SCOPE: CPT | Performed by: EMERGENCY MEDICINE

## 2022-06-22 PROCEDURE — 72100 X-RAY EXAM L-S SPINE 2/3 VWS: CPT | Performed by: EMERGENCY MEDICINE

## 2022-06-22 PROCEDURE — 73502 X-RAY EXAM HIP UNI 2-3 VIEWS: CPT | Performed by: EMERGENCY MEDICINE

## 2022-06-22 PROCEDURE — 76882 US LMTD JT/FCL EVL NVASC XTR: CPT | Performed by: EMERGENCY MEDICINE

## 2022-06-22 PROCEDURE — 99284 EMERGENCY DEPT VISIT MOD MDM: CPT

## 2022-06-22 RX ORDER — CYCLOBENZAPRINE HCL 10 MG
10 TABLET ORAL 3 TIMES DAILY PRN
Qty: 20 TABLET | Refills: 0 | Status: SHIPPED | OUTPATIENT
Start: 2022-06-22 | End: 2022-06-29

## 2022-06-22 RX ORDER — METHYLPREDNISOLONE 4 MG/1
TABLET ORAL
Qty: 1 EACH | Refills: 0 | Status: SHIPPED | OUTPATIENT
Start: 2022-06-22

## 2022-06-29 ENCOUNTER — HOSPITAL ENCOUNTER (OUTPATIENT)
Dept: ULTRASOUND IMAGING | Age: 67
Discharge: HOME | End: 2022-06-29
Attending: INTERNAL MEDICINE
Payer: MEDICARE

## 2022-06-29 ENCOUNTER — TELEPHONE (OUTPATIENT)
Dept: INTERNAL MEDICINE CLINIC | Facility: CLINIC | Age: 67
End: 2022-06-29

## 2022-06-29 DIAGNOSIS — R59.0 INGUINAL LYMPHADENOPATHY: ICD-10-CM

## 2022-06-29 NOTE — TELEPHONE ENCOUNTER
Patient is ok to schedule a 15 minute video/phone visit per Dr. Brad Watts to discuss next steps. Patient will call us to schedule.

## 2022-07-01 ENCOUNTER — PATIENT MESSAGE (OUTPATIENT)
Dept: INTERNAL MEDICINE CLINIC | Facility: CLINIC | Age: 67
End: 2022-07-01

## 2022-07-01 ENCOUNTER — TELEMEDICINE (OUTPATIENT)
Dept: INTERNAL MEDICINE CLINIC | Facility: CLINIC | Age: 67
End: 2022-07-01
Payer: MEDICARE

## 2022-07-01 DIAGNOSIS — R10.32 LEFT GROIN PAIN: Primary | ICD-10-CM

## 2022-07-01 DIAGNOSIS — M54.10 RADICULAR PAIN: ICD-10-CM

## 2022-07-01 DIAGNOSIS — R59.0 INGUINAL LYMPHADENOPATHY: ICD-10-CM

## 2022-07-01 NOTE — PROGRESS NOTES
Virtual Telephone Check-In    Teofilo Torres verbally consents to a Virtual/Telephone Check-In visit on 07/01/22. Patient has been referred to the [facility] website at [email or web address] to review the yearly Consent to Treat document. Patient understands and accepts financial responsibility for any deductible, co-insurance and/or co-pays associated with this service. Duration of the service: 16 minutes      Summary of topics discussed:   Worsening L groin pain. She notes pain is worse with standing and walking and better with sitting. Pain described as sharp and intermittent. She has had 2 episodes where pain travels down anterior aspect of leg but this does not happen commonly. She was found to have a prominent inguinal lymph node in April and was diagnosed with a UTI at that time. Pain was present for a week back at that time. She did not take the medrol dose pack that was prescribed at recent ER visit as she wanted to talk about it first.     Diagnoses and all orders for this visit:    Left groin pain  -     MRI PELVIS (SOFT TISSUE) (W+WO) (CPT=72197); Future  -     MRI SPINE LUMBAR (CPT=72148); Future    Inguinal lymphadenopathy  -     MRI PELVIS (SOFT TISSUE) (W+WO) (CPT=72197); Future    Radicular pain  -     MRI SPINE LUMBAR (CPT=72148); Future    Plan: will pursue additional imaging in setting of worsening pain. Ok to proceed with medrol dose pack for anti-inflammatory as prescribed by ER.      MRI lumbar spine without contrast   MRI pelvis with and without contrast   Kosta: potential compression lumbosacral plexus Attn: Dr. Ren Sanchez MD

## 2022-07-07 ENCOUNTER — TELEPHONE (OUTPATIENT)
Dept: INTERNAL MEDICINE CLINIC | Facility: CLINIC | Age: 67
End: 2022-07-07

## 2022-07-07 NOTE — TELEPHONE ENCOUNTER
Incoming fax from [facility] with MRI Lumbar sp w/o contrast results   Placed in LS in-basket for review

## 2022-07-11 ENCOUNTER — TELEPHONE (OUTPATIENT)
Dept: SURGERY | Facility: CLINIC | Age: 67
End: 2022-07-11

## 2022-07-11 NOTE — TELEPHONE ENCOUNTER
BEATRIZ    Called patient to get the best times to make appointment she stated anytime after [facility] Rasmussen office spoke to [place]. Dr. Margaret Rasmussen is booked through September 8th and his PA's are booked up through 3 weeks. She will speak to one of his nurses to see if there is anyway to get the patient in.

## 2022-07-11 NOTE — TELEPHONE ENCOUNTER
Please call Dr. Xavier Ho office to help facilitate an appointment with the next 1-2 weeks. She is having severe pain in leg/groin and MRI shows mass effect on S1 nerve root and bilateral L5 nerve roots.  She is an established patient of Dr. Rubina Taylor

## 2022-07-11 NOTE — TELEPHONE ENCOUNTER
Jordi Hayes from Dr. Blayne Lopez office calling to have pt seen this week due to MRI results. Please advise where pt can be scheduled.

## 2022-07-11 NOTE — TELEPHONE ENCOUNTER
From: Juan Caal MD  To: Torri Vasquez  Sent: 7/1/2022 2:59 PM CDT  Subject: MRI order    Hi Ms. Savanna Anguiano,     I spoke with one of the radiologists about your case and we will check an MRI of the pelvis as well as the lumbar spine in case this pain is referred pain from a problem in the lower spinal area.  You can call [facility] to schedule the appointment at [phone].     [address]     Sincerely,   Dr. Demond Lopez

## 2022-07-11 NOTE — TELEPHONE ENCOUNTER
Noted that patient's PCP office has requested that patient be seen in [facility] by provider at date earlier than what is available. Routed to SEAN pool.

## 2022-07-11 NOTE — TELEPHONE ENCOUNTER
Per ROXANA Duong:  Nursing may call to schedule patient to be seen by Abdulkadir Leon and may take two half hour appointments as she is a new patient. Called patient who agreed to see Ana Sebastian tomorrow at [phone]. Appointment scheduled. [employer] message sent to patient with information on location of clinic as requested by patient.

## 2022-07-12 ENCOUNTER — OFFICE VISIT (OUTPATIENT)
Dept: SURGERY | Facility: CLINIC | Age: 67
End: 2022-07-12
Payer: MEDICARE

## 2022-07-12 ENCOUNTER — TELEPHONE (OUTPATIENT)
Dept: SURGERY | Facility: CLINIC | Age: 67
End: 2022-07-12

## 2022-07-12 ENCOUNTER — TELEPHONE (OUTPATIENT)
Dept: NEUROLOGY | Facility: CLINIC | Age: 67
End: 2022-07-12

## 2022-07-12 VITALS — HEART RATE: 96 BPM | SYSTOLIC BLOOD PRESSURE: 110 MMHG | DIASTOLIC BLOOD PRESSURE: 70 MMHG

## 2022-07-12 DIAGNOSIS — M54.10 RADICULAR PAIN: ICD-10-CM

## 2022-07-12 DIAGNOSIS — R10.32 LEFT INGUINAL PAIN: Primary | ICD-10-CM

## 2022-07-12 PROCEDURE — 3074F SYST BP LT 130 MM HG: CPT | Performed by: PHYSICIAN ASSISTANT

## 2022-07-12 PROCEDURE — 3078F DIAST BP <80 MM HG: CPT | Performed by: PHYSICIAN ASSISTANT

## 2022-07-12 PROCEDURE — 99213 OFFICE O/P EST LOW 20 MIN: CPT | Performed by: PHYSICIAN ASSISTANT

## 2022-07-12 RX ORDER — ACETAMINOPHEN 500 MG
500 TABLET ORAL EVERY 6 HOURS PRN
COMMUNITY

## 2022-07-12 NOTE — PROGRESS NOTES
Pt here for left groin pain since April that travels down to left leg . Has difficult time standing up per pt. No N/T. Pt took MEDROL pack- felt some relief but not really helper per pt.     No surgeries  No injections  No PT      Pain 10/10 at worse

## 2022-07-12 NOTE — TELEPHONE ENCOUNTER
Marika from Dr. Stas Johnson office called back at 9:30 to inform us that the referral had been approved. Patient was also informed of this so she was able to keep appointment.

## 2022-07-12 NOTE — TELEPHONE ENCOUNTER
Pt referred to Pain by Katlyn Rod. She has HMO and will need referral placed in system.   Pt scheduled on 7.18.22

## 2022-07-12 NOTE — TELEPHONE ENCOUNTER
Patient was referred by Dr. Leila Del Castillo office due to abnormal MRI. RN called patient and offered appointment for today. Pt does not have referral as pt has HMO plan. Called pt to inform this. Pt said well don't you look at this before you call me? I indicated that I would call PCP office. PCP office will call back by 9:30 am if referral thru insurance does not get authorized.   Patient informed I will call her back by 9:30 if no referral.

## 2022-07-14 ENCOUNTER — TELEPHONE (OUTPATIENT)
Dept: ORTHOPEDICS CLINIC | Facility: CLINIC | Age: 67
End: 2022-07-14

## 2022-07-14 NOTE — TELEPHONE ENCOUNTER
Patient was referred to Dr. Brett Phan for a thigh/groin issue. Patient states the pain radiates from thigh to groin area. She states she has seen multiple specialists as well. Imaging in epic. Please advise if patient can be scheduled with Dr. Brett Phan.

## 2022-07-18 ENCOUNTER — OFFICE VISIT (OUTPATIENT)
Dept: PAIN CLINIC | Facility: CLINIC | Age: 67
End: 2022-07-18
Payer: MEDICARE

## 2022-07-18 VITALS
OXYGEN SATURATION: 98 % | WEIGHT: 160 LBS | BODY MASS INDEX: 27.31 KG/M2 | HEIGHT: 64 IN | DIASTOLIC BLOOD PRESSURE: 82 MMHG | RESPIRATION RATE: 16 BRPM | HEART RATE: 110 BPM | SYSTOLIC BLOOD PRESSURE: 122 MMHG

## 2022-07-18 DIAGNOSIS — R10.32 LEFT INGUINAL PAIN: Primary | ICD-10-CM

## 2022-07-19 ENCOUNTER — TELEPHONE (OUTPATIENT)
Dept: NEUROLOGY | Facility: CLINIC | Age: 67
End: 2022-07-19

## 2022-07-19 NOTE — TELEPHONE ENCOUNTER
Prior authorization request completed for: Left Iliopsosas bursa injection   Authorization # [id number]   Authorization dates: 7/19/2022 - 10/17/2022  CPT codes approved: 67291 / 16810  Number of visits/dates of service approved: 1  Physician: Dr. Mitra Sultana   Location: Office w ultrasound     Patient can be scheduled. Routed to Navigator.

## 2022-07-20 NOTE — TELEPHONE ENCOUNTER
Called pt and scheduled injection for 8/1/22 at 3:30pm. Pt would like to have injection done sooner if a spot opens so I added her appointment to the wait list. Pt denies taking medications that require holding. No further needs at this time.

## 2022-08-01 ENCOUNTER — OFFICE VISIT (OUTPATIENT)
Dept: PAIN CLINIC | Facility: CLINIC | Age: 67
End: 2022-08-01
Payer: MEDICARE

## 2022-08-01 VITALS — DIASTOLIC BLOOD PRESSURE: 70 MMHG | OXYGEN SATURATION: 98 % | SYSTOLIC BLOOD PRESSURE: 110 MMHG | HEART RATE: 87 BPM

## 2022-08-01 DIAGNOSIS — R10.32 LEFT INGUINAL PAIN: Primary | ICD-10-CM

## 2022-08-01 RX ORDER — LIDOCAINE HYDROCHLORIDE 10 MG/ML
10 INJECTION, SOLUTION INFILTRATION; PERINEURAL ONCE
Status: COMPLETED | OUTPATIENT
Start: 2022-08-01 | End: 2022-08-01

## 2022-08-01 RX ORDER — TRIAMCINOLONE ACETONIDE 40 MG/ML
40 INJECTION, SUSPENSION INTRA-ARTICULAR; INTRAMUSCULAR ONCE
Status: COMPLETED | OUTPATIENT
Start: 2022-08-01 | End: 2022-08-01

## 2022-08-01 RX ORDER — BUPIVACAINE HYDROCHLORIDE 7.5 MG/ML
5 INJECTION, SOLUTION EPIDURAL; RETROBULBAR ONCE
Status: COMPLETED | OUTPATIENT
Start: 2022-08-01 | End: 2022-08-01

## 2022-08-01 NOTE — PROCEDURES
Date of procedure: FIRST, 2022    Preop diagnosis: Iliopsoas bursitis    Postop diagnosis: Same    Procedure: Left iliopsoas bursa injection    Surgeon: Nish Perez    Anesthesia: Lidocaine    EBL: 0    Indication: The patient is a pleasant 58-year-old with left-sided groin pain    Procedure detail: Informed consent was obtained. A timeout was done. The skin overlying the left groin was prepped in usual sterile fashion. Sterile ultrasound imaging was used identify the iliopsoas bursa. The overlying soft tissue was then anesthetized with 6 cc 1% lidocaine. Subsequently, a 21-gauge Stimuplex needle was advanced with imaging guidance towards the iliopsoas bursa. After repeat negative aspiration for heme, a total mixture of 40 mg of triamcinolone with 5 cc 1% lidocaine was injected. There was excellent spread. All relevant imaging saved to the PACS system. The needle was withdrawn to tip intact.   Patient tolerated procedure well     Nish Perez MD

## 2022-08-01 NOTE — PROGRESS NOTES
Timeout completed prior to procedure @ 4687  Participants present for timeout:  Dr. Drew Bruce, and patient.

## 2022-08-01 NOTE — PROGRESS NOTES
Patient presents in office today with reported pain in groin left side     Current pain level reported =6 /10    Last reported dose of nothing       Narcotic Contract renewal na    Urine Drug screen na

## 2022-08-08 ENCOUNTER — TELEPHONE (OUTPATIENT)
Dept: PAIN CLINIC | Facility: CLINIC | Age: 67
End: 2022-08-08

## 2022-08-08 NOTE — TELEPHONE ENCOUNTER
Pt calling stating she is in a lot of pain, Pt states Dr Caren Schwab advised that 3-5 days should go by and she should start feeling better. Pt states it has been 7 days and she is in worst pain than she previously was. Pt states she can't stand or walk.     Please advise

## 2022-08-08 NOTE — TELEPHONE ENCOUNTER
Carlos Soto MD  You 9 minutes ago (12:48 PM)         She should schedule a follow up. Possible injection not effective for her. Would recommend orthopedics evaluation     Contacted pt to relay the message above. Pt states that her pain is worse than before she came in for injection. Pt state sthat upon laying on her right side it feels something is pulling and it's very painful.   Pt agreed to the plan of following up with /Scheduled the pt for OV on 8/10 at 8:45am

## 2022-08-10 ENCOUNTER — OFFICE VISIT (OUTPATIENT)
Dept: PAIN CLINIC | Facility: CLINIC | Age: 67
End: 2022-08-10
Payer: MEDICARE

## 2022-08-10 VITALS
HEART RATE: 83 BPM | DIASTOLIC BLOOD PRESSURE: 66 MMHG | WEIGHT: 160 LBS | OXYGEN SATURATION: 97 % | SYSTOLIC BLOOD PRESSURE: 108 MMHG | BODY MASS INDEX: 27 KG/M2

## 2022-08-10 DIAGNOSIS — R10.32 LEFT INGUINAL PAIN: Primary | ICD-10-CM

## 2022-08-10 PROCEDURE — 99215 OFFICE O/P EST HI 40 MIN: CPT | Performed by: ANESTHESIOLOGY

## 2022-08-10 PROCEDURE — 3078F DIAST BP <80 MM HG: CPT | Performed by: ANESTHESIOLOGY

## 2022-08-10 PROCEDURE — 3074F SYST BP LT 130 MM HG: CPT | Performed by: ANESTHESIOLOGY

## 2022-08-10 RX ORDER — KETOCONAZOLE 20 MG/ML
SHAMPOO TOPICAL
COMMUNITY
Start: 2022-08-09

## 2022-08-10 NOTE — PROGRESS NOTES
Last procedure: left iliopsos bursa   Date: 08/01/22  Percentage of relief obtained: 0  Duration of relief: NA    Current Pain Score: 0 - because she has been sitting. But she had terrible pain last night and this morning.

## 2022-08-13 DIAGNOSIS — R10.32 LEFT INGUINAL PAIN: Primary | ICD-10-CM

## 2022-08-26 ENCOUNTER — LAB ENCOUNTER (OUTPATIENT)
Dept: LAB | Age: 67
End: 2022-08-26
Attending: INTERNAL MEDICINE
Payer: MEDICARE

## 2022-08-26 ENCOUNTER — OFFICE VISIT (OUTPATIENT)
Dept: INTERNAL MEDICINE CLINIC | Facility: CLINIC | Age: 67
End: 2022-08-26
Payer: MEDICARE

## 2022-08-26 VITALS
TEMPERATURE: 98 F | BODY MASS INDEX: 30.31 KG/M2 | HEIGHT: 63.79 IN | HEART RATE: 93 BPM | SYSTOLIC BLOOD PRESSURE: 112 MMHG | DIASTOLIC BLOOD PRESSURE: 79 MMHG | RESPIRATION RATE: 16 BRPM | WEIGHT: 175.38 LBS

## 2022-08-26 DIAGNOSIS — R73.03 PRE-DIABETES: ICD-10-CM

## 2022-08-26 DIAGNOSIS — F51.04 CHRONIC INSOMNIA: ICD-10-CM

## 2022-08-26 DIAGNOSIS — R73.03 PREDIABETES: ICD-10-CM

## 2022-08-26 DIAGNOSIS — M81.0 AGE-RELATED OSTEOPOROSIS WITHOUT CURRENT PATHOLOGICAL FRACTURE: ICD-10-CM

## 2022-08-26 DIAGNOSIS — E78.00 HYPERCHOLESTEROLEMIA: ICD-10-CM

## 2022-08-26 DIAGNOSIS — Z00.00 WELLNESS EXAMINATION: Primary | ICD-10-CM

## 2022-08-26 DIAGNOSIS — L90.0 LICHEN SCLEROSUS: ICD-10-CM

## 2022-08-26 DIAGNOSIS — R10.32 LEFT INGUINAL PAIN: ICD-10-CM

## 2022-08-26 LAB
ALBUMIN SERPL-MCNC: 3.7 G/DL (ref 3.4–5)
ALBUMIN/GLOB SERPL: 1.1 {RATIO} (ref 1–2)
ALP LIVER SERPL-CCNC: 50 U/L
ALT SERPL-CCNC: 26 U/L
ANION GAP SERPL CALC-SCNC: 4 MMOL/L (ref 0–18)
AST SERPL-CCNC: 21 U/L (ref 15–37)
BILIRUB SERPL-MCNC: 0.3 MG/DL (ref 0.1–2)
BUN BLD-MCNC: 9 MG/DL (ref 7–18)
CALCIUM BLD-MCNC: 8.7 MG/DL (ref 8.5–10.1)
CHLORIDE SERPL-SCNC: 109 MMOL/L (ref 98–112)
CHOLEST SERPL-MCNC: 214 MG/DL (ref ?–200)
CO2 SERPL-SCNC: 25 MMOL/L (ref 21–32)
CREAT BLD-MCNC: 0.77 MG/DL
EST. AVERAGE GLUCOSE BLD GHB EST-MCNC: 123 MG/DL (ref 68–126)
FASTING PATIENT LIPID ANSWER: YES
FASTING STATUS PATIENT QL REPORTED: YES
GFR SERPLBLD BASED ON 1.73 SQ M-ARVRAT: 84 ML/MIN/1.73M2 (ref 60–?)
GLOBULIN PLAS-MCNC: 3.5 G/DL (ref 2.8–4.4)
GLUCOSE BLD-MCNC: 90 MG/DL (ref 70–99)
HBA1C MFR BLD: 5.9 % (ref ?–5.7)
HDLC SERPL-MCNC: 61 MG/DL (ref 40–59)
LDLC SERPL CALC-MCNC: 117 MG/DL (ref ?–100)
NONHDLC SERPL-MCNC: 153 MG/DL (ref ?–130)
OSMOLALITY SERPL CALC.SUM OF ELEC: 284 MOSM/KG (ref 275–295)
POTASSIUM SERPL-SCNC: 4.1 MMOL/L (ref 3.5–5.1)
PROT SERPL-MCNC: 7.2 G/DL (ref 6.4–8.2)
SODIUM SERPL-SCNC: 138 MMOL/L (ref 136–145)
TRIGL SERPL-MCNC: 211 MG/DL (ref 30–149)
VLDLC SERPL CALC-MCNC: 37 MG/DL (ref 0–30)

## 2022-08-26 PROCEDURE — 96160 PT-FOCUSED HLTH RISK ASSMT: CPT | Performed by: INTERNAL MEDICINE

## 2022-08-26 PROCEDURE — 3078F DIAST BP <80 MM HG: CPT | Performed by: INTERNAL MEDICINE

## 2022-08-26 PROCEDURE — G0439 PPPS, SUBSEQ VISIT: HCPCS | Performed by: INTERNAL MEDICINE

## 2022-08-26 PROCEDURE — 99397 PER PM REEVAL EST PAT 65+ YR: CPT | Performed by: INTERNAL MEDICINE

## 2022-08-26 PROCEDURE — 36415 COLL VENOUS BLD VENIPUNCTURE: CPT

## 2022-08-26 PROCEDURE — 80053 COMPREHEN METABOLIC PANEL: CPT

## 2022-08-26 PROCEDURE — 3008F BODY MASS INDEX DOCD: CPT | Performed by: INTERNAL MEDICINE

## 2022-08-26 PROCEDURE — 83036 HEMOGLOBIN GLYCOSYLATED A1C: CPT

## 2022-08-26 PROCEDURE — 80061 LIPID PANEL: CPT

## 2022-08-26 PROCEDURE — 3074F SYST BP LT 130 MM HG: CPT | Performed by: INTERNAL MEDICINE

## 2022-08-26 RX ORDER — ALENDRONATE SODIUM 70 MG/1
70 TABLET ORAL
Refills: 0 | COMMUNITY
Start: 2022-08-26

## 2022-09-01 PROBLEM — M54.2 NECK PAIN: Status: RESOLVED | Noted: 2021-04-23 | Resolved: 2022-09-01

## 2022-09-07 DIAGNOSIS — L90.0 LICHEN SCLEROSUS: ICD-10-CM

## 2022-09-07 NOTE — TELEPHONE ENCOUNTER
PORTIA Mccullough [facility]   [phone]    Sparkle Sarmiento requesting refill on behalf of pt to be sent to mail order pharmacy.       Desoximetasone 0.25 % External Cream 15 g

## 2022-09-08 RX ORDER — DESOXIMETASONE 2.5 MG/G
CREAM TOPICAL
Qty: 15 G | Refills: 1 | Status: SHIPPED | OUTPATIENT
Start: 2022-09-08

## 2022-09-08 NOTE — TELEPHONE ENCOUNTER
No Protocol     Requesting: desoximetasone 0.25%    LOV:8/26/22   RTC: no follow ups on file   Filled:5/18/22 #15g 3 refills   Recent Labs: 8/26/22     Upcoming OV: none noted

## 2022-10-07 RX ORDER — ERGOCALCIFEROL 1.25 MG/1
CAPSULE ORAL
Qty: 12 CAPSULE | Refills: 0 | OUTPATIENT
Start: 2022-10-07

## 2022-10-28 DIAGNOSIS — L90.0 LICHEN SCLEROSUS: ICD-10-CM

## 2022-10-28 RX ORDER — DESOXIMETASONE 2.5 MG/G
CREAM TOPICAL
Qty: 15 G | Refills: 1 | Status: SHIPPED | OUTPATIENT
Start: 2022-10-28

## 2022-11-09 ENCOUNTER — TELEPHONE (OUTPATIENT)
Dept: INTERNAL MEDICINE CLINIC | Facility: CLINIC | Age: 67
End: 2022-11-09

## 2022-11-09 NOTE — TELEPHONE ENCOUNTER
Pt requesting referral for EDW PT for left inguinal pain. Pt states she was referred to a pain specialist from ,she states the pain specialist did not help her pain.      Please place referral

## 2022-11-09 NOTE — TELEPHONE ENCOUNTER
Pt has referral for both ortho and PT. Per Dr Dolores Araujo recommendations, pt is to consult with ortho. Pt given Dr Zenobia Cabrera recommendations, reminded that she has referrals already for ortho/PT and can schedule with them as needed.

## 2022-12-23 ENCOUNTER — OFFICE VISIT (OUTPATIENT)
Dept: PAIN CLINIC | Facility: CLINIC | Age: 67
End: 2022-12-23
Payer: MEDICARE

## 2022-12-23 DIAGNOSIS — R10.32 LEFT INGUINAL PAIN: Primary | ICD-10-CM

## 2022-12-23 DIAGNOSIS — M79.605 LEFT LEG PAIN: ICD-10-CM

## 2022-12-29 ENCOUNTER — OFFICE VISIT (OUTPATIENT)
Dept: PAIN CLINIC | Facility: CLINIC | Age: 67
End: 2022-12-29
Payer: MEDICARE

## 2022-12-29 DIAGNOSIS — M81.0 AGE-RELATED OSTEOPOROSIS WITHOUT CURRENT PATHOLOGICAL FRACTURE: ICD-10-CM

## 2022-12-29 DIAGNOSIS — R10.32 LEFT INGUINAL PAIN: Primary | ICD-10-CM

## 2022-12-29 PROCEDURE — 97811 ACUP 1/> W/O ESTIM EA ADD 15: CPT | Performed by: ACUPUNCTURIST

## 2022-12-29 PROCEDURE — 97810 ACUP 1/> WO ESTIM 1ST 15 MIN: CPT | Performed by: ACUPUNCTURIST

## 2023-01-04 RX ORDER — ALENDRONATE SODIUM 70 MG/1
TABLET ORAL
Qty: 12 TABLET | Refills: 3 | Status: SHIPPED | OUTPATIENT
Start: 2023-01-04

## 2023-01-04 NOTE — TELEPHONE ENCOUNTER
Protocol passed     Requesting: alendronate 70mg     LOV: 8/26/22   RTC: no follow ups on file   Filled: 10/7/22 # 12 0 refills   Recent Labs: 8/26/22   Last dexa 11/9/21   Upcoming OV: none scheduled

## 2023-01-11 ENCOUNTER — TELEPHONE (OUTPATIENT)
Dept: PHYSICAL THERAPY | Facility: HOSPITAL | Age: 68
End: 2023-01-11

## 2023-01-12 ENCOUNTER — OFFICE VISIT (OUTPATIENT)
Dept: PAIN CLINIC | Facility: CLINIC | Age: 68
End: 2023-01-12
Payer: MEDICARE

## 2023-01-12 ENCOUNTER — APPOINTMENT (OUTPATIENT)
Dept: PHYSICAL THERAPY | Facility: HOSPITAL | Age: 68
End: 2023-01-12
Attending: PHYSICIAN ASSISTANT
Payer: MEDICARE

## 2023-01-12 DIAGNOSIS — R10.32 LEFT INGUINAL PAIN: Primary | ICD-10-CM

## 2023-01-12 DIAGNOSIS — M79.605 LEFT LEG PAIN: ICD-10-CM

## 2023-01-13 ENCOUNTER — TELEPHONE (OUTPATIENT)
Dept: INTERNAL MEDICINE CLINIC | Facility: CLINIC | Age: 68
End: 2023-01-13

## 2023-01-13 ENCOUNTER — TELEPHONE (OUTPATIENT)
Dept: PHYSICAL THERAPY | Facility: HOSPITAL | Age: 68
End: 2023-01-13

## 2023-01-13 NOTE — TELEPHONE ENCOUNTER
Vic Aleman LCSW, [facility]  P Emg 08 Clinical Staff; Shikha Gomez MD  Hi all,     I received a confusing VM for this pt saying she needed something for her insurance from Dr. Olaynika Draper, so I wanted to make sure I passed it along. In part of the VM she said she completely lost her train of thought and couldn't remember why she called or what she need, but to please call her back. ([address] [facility], who hasn't had one of those moments?). Thank you!      Heydi

## 2023-01-13 NOTE — TELEPHONE ENCOUNTER
KO & LS FYI    Staff message received from [facility] today at 1:00 PM - pt left a vm that she needed something from Dr. Raven Tracey but was unclear in the message what pt needed assistance with. [phone] spoke to pt who explained that she started seeing pain clinic for her sciatic. Pain doc gave her a shot, that didn't help. Recommended acupuncture. Pt has seen Dr. Suze Waddell, [address], [facility]) for 3 visits and it seems to be helping. Next recommendation was to add physical therapy along with the acupuncture treatments. THEN pt got a call today telling her Dr. Montse Mckeon' office will NOT bill [employer]. They want pt to pay $75 up front for every visit going forward - which pt cannot afford to do. Pt was previously [facility] a [facility] to see acupuncturist that is OON because there is not one within her network for over 100 miles. Pt needs new referral for acupuncture and PT, pt lives in [place]. Pt needed to leave for an eye appt. Pt has not selected a new acupuncturist. Pt asked me to send her a [facility].      [facility] sent to pt - she will call us back next week with doctor name she wants on the referral.

## 2023-01-19 ENCOUNTER — OFFICE VISIT (OUTPATIENT)
Dept: PHYSICAL THERAPY | Facility: HOSPITAL | Age: 68
End: 2023-01-19
Attending: PHYSICIAN ASSISTANT
Payer: MEDICARE

## 2023-01-19 PROCEDURE — 97162 PT EVAL MOD COMPLEX 30 MIN: CPT

## 2023-01-19 PROCEDURE — 97110 THERAPEUTIC EXERCISES: CPT

## 2023-01-20 ENCOUNTER — PATIENT MESSAGE (OUTPATIENT)
Dept: INTERNAL MEDICINE CLINIC | Facility: CLINIC | Age: 68
End: 2023-01-20

## 2023-01-20 NOTE — TELEPHONE ENCOUNTER
From: Charlee Room  To: [place] Race  Sent: 1/13/2023 2:07 PM CST  Subject: Acupunture Referral    Real Simpson,     All three of these were on the Internet and have web sites you can visit. 1) [address] [place] [address]  [facility] [address]  Phone [phone]    2) Hannah Goldmann, L. Ac, [employer]. Acupuncture & Chinese Medicine  [address], [address] [facility]  [address] [place] [address]  Phone [phone], [phone]  Cell [phone]    3) [employer] [facility] [employer] ([address]) "[employer]  [address] [facility] [address]  Phone [phone]    When you decide where you want to go, we will need the acupuncturists name, and if possible, their NPI number. This is like their state licence number.  This is helpful on the referral.     Sincerely,   Jocelyn Quinteros

## 2023-01-20 NOTE — TELEPHONE ENCOUNTER
Will call [facility] after 10:00 AM when they open to obtain NPI # for the referral.    11:00 AM - Called [facility] [phone] spoke to Alexy. They do not bill insurance, but they would provide the pt with a bill to submit to her insurance. Obtained their office NPI [id number]. Sent [facility] to pt with this information - she stated previously that she could not afford to pay out of pocket with last acupuncture provider.      Awaiting response from pt prior to placing referral.

## 2023-01-21 ENCOUNTER — PATIENT MESSAGE (OUTPATIENT)
Dept: INTERNAL MEDICINE CLINIC | Facility: CLINIC | Age: 68
End: 2023-01-21

## 2023-01-23 NOTE — TELEPHONE ENCOUNTER
From: Carmine Began  To: Carlos Jo  Sent: 1/13/2023 2:07 PM CST  Subject: Acupunture Referral    Real Simpson,     All three of these were on the Internet and have web sites you can visit. 1) [address] [address] [address]  Phone [phone]    2) EILEEN Serrano, [employer]. Acupuncture & Chinese Medicine  [address], [address] [address]  Phone [phone], ext [phone]  Cell [phone]    3) [employer] ([address]) [employer]  [address] [facility] [address]  Phone [phone]    When you decide where you want to go, we will need the acupuncturists name, and if possible, their NPI number. This is like their state licence number.  This is helpful on the referral.     Sincerely,   Adelaida Boles

## 2023-01-26 ENCOUNTER — OFFICE VISIT (OUTPATIENT)
Dept: PHYSICAL THERAPY | Facility: HOSPITAL | Age: 68
End: 2023-01-26
Attending: PHYSICIAN ASSISTANT
Payer: MEDICARE

## 2023-01-26 PROCEDURE — 97110 THERAPEUTIC EXERCISES: CPT

## 2023-01-26 PROCEDURE — 97140 MANUAL THERAPY 1/> REGIONS: CPT

## 2023-02-02 ENCOUNTER — OFFICE VISIT (OUTPATIENT)
Dept: PHYSICAL THERAPY | Facility: HOSPITAL | Age: 68
End: 2023-02-02
Attending: PHYSICIAN ASSISTANT
Payer: MEDICARE

## 2023-02-02 PROCEDURE — 97110 THERAPEUTIC EXERCISES: CPT

## 2023-02-02 PROCEDURE — 97140 MANUAL THERAPY 1/> REGIONS: CPT

## 2023-02-09 ENCOUNTER — TELEPHONE (OUTPATIENT)
Dept: PHYSICAL THERAPY | Facility: HOSPITAL | Age: 68
End: 2023-02-09

## 2023-02-09 ENCOUNTER — APPOINTMENT (OUTPATIENT)
Dept: PHYSICAL THERAPY | Facility: HOSPITAL | Age: 68
End: 2023-02-09
Attending: PHYSICIAN ASSISTANT
Payer: MEDICARE

## 2023-02-16 ENCOUNTER — OFFICE VISIT (OUTPATIENT)
Dept: PHYSICAL THERAPY | Facility: HOSPITAL | Age: 68
End: 2023-02-16
Attending: PHYSICIAN ASSISTANT
Payer: MEDICARE

## 2023-02-16 PROCEDURE — 97140 MANUAL THERAPY 1/> REGIONS: CPT

## 2023-02-16 PROCEDURE — 97110 THERAPEUTIC EXERCISES: CPT

## 2023-02-23 ENCOUNTER — OFFICE VISIT (OUTPATIENT)
Dept: PHYSICAL THERAPY | Facility: HOSPITAL | Age: 68
End: 2023-02-23
Attending: PHYSICIAN ASSISTANT
Payer: MEDICARE

## 2023-02-23 PROCEDURE — 97110 THERAPEUTIC EXERCISES: CPT

## 2023-02-23 PROCEDURE — 97140 MANUAL THERAPY 1/> REGIONS: CPT

## 2023-03-01 ENCOUNTER — TELEPHONE (OUTPATIENT)
Dept: PHYSICAL THERAPY | Facility: HOSPITAL | Age: 68
End: 2023-03-01

## 2023-03-02 ENCOUNTER — APPOINTMENT (OUTPATIENT)
Dept: PHYSICAL THERAPY | Facility: HOSPITAL | Age: 68
End: 2023-03-02
Attending: PHYSICIAN ASSISTANT
Payer: MEDICARE

## 2023-03-03 ENCOUNTER — OFFICE VISIT (OUTPATIENT)
Dept: PHYSICAL THERAPY | Facility: HOSPITAL | Age: 68
End: 2023-03-03
Attending: PHYSICIAN ASSISTANT
Payer: MEDICARE

## 2023-03-03 ENCOUNTER — TELEPHONE (OUTPATIENT)
Dept: INTERNAL MEDICINE CLINIC | Facility: CLINIC | Age: 68
End: 2023-03-03

## 2023-03-03 DIAGNOSIS — R10.32 LEFT INGUINAL PAIN: Primary | ICD-10-CM

## 2023-03-03 DIAGNOSIS — M79.605 LEFT LEG PAIN: ICD-10-CM

## 2023-03-03 PROCEDURE — 97140 MANUAL THERAPY 1/> REGIONS: CPT

## 2023-03-03 PROCEDURE — 97110 THERAPEUTIC EXERCISES: CPT

## 2023-03-03 NOTE — TELEPHONE ENCOUNTER
Pt called stating that Dr Tyra Hines in not in network and needs a new referral. [facility] is willing to pay for the visits but they need a back dated referral. Referral department is probably call in to work with Teena Armstrong to get it approved.     Pt seen on  12/23/22,12/29/22,1/12/23    Left inguinal pain R10.32    Left leg pain M79.605

## 2023-03-03 NOTE — TELEPHONE ENCOUNTER
LS - pended referral, if appropriate. Please advise, I will need to send message to referral department.  Ty!

## 2023-03-06 ENCOUNTER — OFFICE VISIT (OUTPATIENT)
Dept: INTERNAL MEDICINE CLINIC | Facility: CLINIC | Age: 68
End: 2023-03-06
Payer: MEDICARE

## 2023-03-06 VITALS
RESPIRATION RATE: 16 BRPM | TEMPERATURE: 98 F | BODY MASS INDEX: 31.32 KG/M2 | HEART RATE: 93 BPM | DIASTOLIC BLOOD PRESSURE: 70 MMHG | HEIGHT: 63.75 IN | WEIGHT: 181.19 LBS | SYSTOLIC BLOOD PRESSURE: 134 MMHG | OXYGEN SATURATION: 99 %

## 2023-03-06 DIAGNOSIS — M81.0 AGE-RELATED OSTEOPOROSIS WITHOUT CURRENT PATHOLOGICAL FRACTURE: ICD-10-CM

## 2023-03-06 DIAGNOSIS — Z12.31 SCREENING MAMMOGRAM FOR BREAST CANCER: ICD-10-CM

## 2023-03-06 DIAGNOSIS — E78.00 HYPERCHOLESTEROLEMIA: ICD-10-CM

## 2023-03-06 DIAGNOSIS — R73.03 PRE-DIABETES: ICD-10-CM

## 2023-03-06 DIAGNOSIS — R59.0 INGUINAL LYMPHADENOPATHY: ICD-10-CM

## 2023-03-06 DIAGNOSIS — R10.32 LEFT INGUINAL PAIN: ICD-10-CM

## 2023-03-06 DIAGNOSIS — E66.9 OBESITY (BMI 30-39.9): ICD-10-CM

## 2023-03-06 DIAGNOSIS — Z00.00 WELLNESS EXAMINATION: Primary | ICD-10-CM

## 2023-03-06 DIAGNOSIS — R39.15 URINARY URGENCY: ICD-10-CM

## 2023-03-06 DIAGNOSIS — L90.0 LICHEN SCLEROSUS: ICD-10-CM

## 2023-03-06 DIAGNOSIS — F51.04 CHRONIC INSOMNIA: ICD-10-CM

## 2023-03-06 PROBLEM — J41.0 SMOKERS' COUGH (HCC): Chronic | Status: ACTIVE | Noted: 2023-03-06

## 2023-03-06 PROCEDURE — G0439 PPPS, SUBSEQ VISIT: HCPCS | Performed by: INTERNAL MEDICINE

## 2023-03-06 PROCEDURE — 3075F SYST BP GE 130 - 139MM HG: CPT | Performed by: INTERNAL MEDICINE

## 2023-03-06 PROCEDURE — 3008F BODY MASS INDEX DOCD: CPT | Performed by: INTERNAL MEDICINE

## 2023-03-06 PROCEDURE — 96160 PT-FOCUSED HLTH RISK ASSMT: CPT | Performed by: INTERNAL MEDICINE

## 2023-03-06 PROCEDURE — 1125F AMNT PAIN NOTED PAIN PRSNT: CPT | Performed by: INTERNAL MEDICINE

## 2023-03-06 PROCEDURE — 3078F DIAST BP <80 MM HG: CPT | Performed by: INTERNAL MEDICINE

## 2023-03-06 PROCEDURE — 99397 PER PM REEVAL EST PAT 65+ YR: CPT | Performed by: INTERNAL MEDICINE

## 2023-03-06 RX ORDER — TRAZODONE HYDROCHLORIDE 50 MG/1
50 TABLET ORAL NIGHTLY PRN
Qty: 90 TABLET | Refills: 1 | Status: SHIPPED | OUTPATIENT
Start: 2023-03-06

## 2023-03-06 RX ORDER — TIRZEPATIDE 2.5 MG/.5ML
2.5 INJECTION, SOLUTION SUBCUTANEOUS
Qty: 2 ML | Refills: 0 | Status: SHIPPED | OUTPATIENT
Start: 2023-03-06

## 2023-03-06 NOTE — TELEPHONE ENCOUNTER
Message sent to [address] [place] [address] in basket with the additional information provided by this pt when she called 3/3/23. Awaiting response.

## 2023-03-06 NOTE — PATIENT INSTRUCTIONS
Blood work    Call to schedule ultrasound    Call to follow up with Dr. Thena Kocher McDaniel/check with your insurance to see if he is in network. Please call office if you schedule appointment and need referral. Bring copy of MRI results with you    Trazodone 1/2 tablet - 1 tablet as needed for sleep     Schedule nurse visit appointment after you obtain medication for weight loss to schedule appointment for teaching. Bring medication with you to appointment. Mounjaro 2.5mg once a week for the first month  [email or web address]. com for instructions on how to give - review prior to nurse visit appointment    Follow up in 1 month

## 2023-03-07 PROBLEM — R39.15 URINARY URGENCY: Status: ACTIVE | Noted: 2023-03-07

## 2023-03-07 PROBLEM — R59.0 INGUINAL LYMPHADENOPATHY: Status: ACTIVE | Noted: 2023-03-07

## 2023-03-07 PROBLEM — E66.9 OBESITY (BMI 30-39.9): Status: ACTIVE | Noted: 2023-03-07

## 2023-03-09 ENCOUNTER — OFFICE VISIT (OUTPATIENT)
Dept: PHYSICAL THERAPY | Facility: HOSPITAL | Age: 68
End: 2023-03-09
Attending: PHYSICIAN ASSISTANT
Payer: MEDICARE

## 2023-03-09 ENCOUNTER — TELEPHONE (OUTPATIENT)
Dept: INTERNAL MEDICINE CLINIC | Facility: CLINIC | Age: 68
End: 2023-03-09

## 2023-03-09 ENCOUNTER — LAB ENCOUNTER (OUTPATIENT)
Dept: LAB | Age: 68
End: 2023-03-09
Attending: INTERNAL MEDICINE
Payer: MEDICARE

## 2023-03-09 DIAGNOSIS — R39.15 URINARY URGENCY: ICD-10-CM

## 2023-03-09 DIAGNOSIS — R73.03 PRE-DIABETES: ICD-10-CM

## 2023-03-09 DIAGNOSIS — E78.00 HYPERCHOLESTEROLEMIA: ICD-10-CM

## 2023-03-09 LAB
ALBUMIN SERPL-MCNC: 3.8 G/DL (ref 3.4–5)
ALBUMIN/GLOB SERPL: 0.9 {RATIO} (ref 1–2)
ALP LIVER SERPL-CCNC: 50 U/L
ALT SERPL-CCNC: 17 U/L
ANION GAP SERPL CALC-SCNC: 5 MMOL/L (ref 0–18)
AST SERPL-CCNC: 23 U/L (ref 15–37)
BILIRUB SERPL-MCNC: 0.4 MG/DL (ref 0.1–2)
BILIRUB UR QL STRIP.AUTO: NEGATIVE
BUN BLD-MCNC: 11 MG/DL (ref 7–18)
CALCIUM BLD-MCNC: 9.3 MG/DL (ref 8.5–10.1)
CHLORIDE SERPL-SCNC: 107 MMOL/L (ref 98–112)
CHOLEST SERPL-MCNC: 217 MG/DL (ref ?–200)
CLARITY UR REFRACT.AUTO: CLEAR
CO2 SERPL-SCNC: 26 MMOL/L (ref 21–32)
COLOR UR AUTO: YELLOW
CREAT BLD-MCNC: 0.72 MG/DL
EST. AVERAGE GLUCOSE BLD GHB EST-MCNC: 128 MG/DL (ref 68–126)
FASTING PATIENT LIPID ANSWER: YES
FASTING STATUS PATIENT QL REPORTED: YES
GFR SERPLBLD BASED ON 1.73 SQ M-ARVRAT: 90 ML/MIN/1.73M2 (ref 60–?)
GLOBULIN PLAS-MCNC: 4.1 G/DL (ref 2.8–4.4)
GLUCOSE BLD-MCNC: 95 MG/DL (ref 70–99)
GLUCOSE UR STRIP.AUTO-MCNC: NEGATIVE MG/DL
HBA1C MFR BLD: 6.1 % (ref ?–5.7)
HDLC SERPL-MCNC: 69 MG/DL (ref 40–59)
KETONES UR STRIP.AUTO-MCNC: NEGATIVE MG/DL
LDLC SERPL CALC-MCNC: 125 MG/DL (ref ?–100)
NITRITE UR QL STRIP.AUTO: NEGATIVE
NONHDLC SERPL-MCNC: 148 MG/DL (ref ?–130)
OSMOLALITY SERPL CALC.SUM OF ELEC: 285 MOSM/KG (ref 275–295)
PH UR STRIP.AUTO: 5 [PH] (ref 5–8)
POTASSIUM SERPL-SCNC: 3.9 MMOL/L (ref 3.5–5.1)
PROT SERPL-MCNC: 7.9 G/DL (ref 6.4–8.2)
PROT UR STRIP.AUTO-MCNC: NEGATIVE MG/DL
RBC UR QL AUTO: NEGATIVE
SODIUM SERPL-SCNC: 138 MMOL/L (ref 136–145)
SP GR UR STRIP.AUTO: 1.01 (ref 1–1.03)
TRIGL SERPL-MCNC: 130 MG/DL (ref 30–149)
UROBILINOGEN UR STRIP.AUTO-MCNC: <2 MG/DL
VLDLC SERPL CALC-MCNC: 23 MG/DL (ref 0–30)

## 2023-03-09 PROCEDURE — 87086 URINE CULTURE/COLONY COUNT: CPT

## 2023-03-09 PROCEDURE — 80053 COMPREHEN METABOLIC PANEL: CPT

## 2023-03-09 PROCEDURE — 81001 URINALYSIS AUTO W/SCOPE: CPT

## 2023-03-09 PROCEDURE — 97140 MANUAL THERAPY 1/> REGIONS: CPT

## 2023-03-09 PROCEDURE — 97110 THERAPEUTIC EXERCISES: CPT

## 2023-03-09 PROCEDURE — 83036 HEMOGLOBIN GLYCOSYLATED A1C: CPT

## 2023-03-09 PROCEDURE — 80061 LIPID PANEL: CPT

## 2023-03-09 PROCEDURE — 36415 COLL VENOUS BLD VENIPUNCTURE: CPT

## 2023-03-09 NOTE — TELEPHONE ENCOUNTER
Patient is to schedule nurse visit once she has obtained the medication   Please assist patient with scheduling nurse visit appt

## 2023-03-09 NOTE — TELEPHONE ENCOUNTER
Pt requesting cb to discuss medication she was recently prescribed. She is asking if she should make an apt so that she can be shown how to use the medication as far as injecting it goes or if she should just read the instructions? Should pt come into the office? F/u to discuss furthermore.      Tirzepatide [facility]) 2.5 MG/0.5ML Subcutaneous Solution Pen-injector

## 2023-03-13 NOTE — TELEPHONE ENCOUNTER
Pt says she watched a couple of videos with Dr. Emily Lara and then at home. Pt says she didn't have complications with the medication and that it was very simple, she doesn't think she needs to come in for a nurse visit. . Pt started medication on Saturday.

## 2023-03-16 ENCOUNTER — OFFICE VISIT (OUTPATIENT)
Dept: PHYSICAL THERAPY | Facility: HOSPITAL | Age: 68
End: 2023-03-16
Attending: PHYSICIAN ASSISTANT
Payer: MEDICARE

## 2023-03-16 PROCEDURE — 97140 MANUAL THERAPY 1/> REGIONS: CPT

## 2023-03-16 PROCEDURE — 97110 THERAPEUTIC EXERCISES: CPT

## 2023-03-20 ENCOUNTER — HOSPITAL ENCOUNTER (OUTPATIENT)
Dept: MAMMOGRAPHY | Age: 68
Discharge: HOME | End: 2023-03-20
Attending: INTERNAL MEDICINE
Payer: MEDICARE

## 2023-03-20 ENCOUNTER — HOSPITAL ENCOUNTER (OUTPATIENT)
Dept: ULTRASOUND IMAGING | Age: 68
Discharge: HOME | End: 2023-03-20
Attending: INTERNAL MEDICINE
Payer: MEDICARE

## 2023-03-20 DIAGNOSIS — R59.0 INGUINAL LYMPHADENOPATHY: ICD-10-CM

## 2023-03-20 DIAGNOSIS — R10.32 LEFT INGUINAL PAIN: ICD-10-CM

## 2023-03-20 DIAGNOSIS — Z12.31 SCREENING MAMMOGRAM FOR BREAST CANCER: ICD-10-CM

## 2023-03-20 PROCEDURE — 77063 BREAST TOMOSYNTHESIS BI: CPT | Performed by: INTERNAL MEDICINE

## 2023-03-20 PROCEDURE — 76882 US LMTD JT/FCL EVL NVASC XTR: CPT | Performed by: INTERNAL MEDICINE

## 2023-03-20 PROCEDURE — 77067 SCR MAMMO BI INCL CAD: CPT | Performed by: INTERNAL MEDICINE

## 2023-03-21 ENCOUNTER — TELEPHONE (OUTPATIENT)
Dept: PHYSICAL THERAPY | Facility: HOSPITAL | Age: 68
End: 2023-03-21

## 2023-03-22 ENCOUNTER — TELEPHONE (OUTPATIENT)
Dept: INTERNAL MEDICINE CLINIC | Facility: CLINIC | Age: 68
End: 2023-03-22

## 2023-03-22 DIAGNOSIS — R59.9 ENLARGED LYMPH NODES: ICD-10-CM

## 2023-03-22 DIAGNOSIS — K40.20 NON-RECURRENT BILATERAL INGUINAL HERNIA WITHOUT OBSTRUCTION OR GANGRENE: Primary | ICD-10-CM

## 2023-03-22 NOTE — TELEPHONE ENCOUNTER
Patient is calling because she saw her US results via [employer] and saw that she has bilateral hernias. She wants to let Dr. Sana Dominique know that she is ready to take action. She has been in too much pain. Patient is also wondering why this wasn't detected in the MRI she had done previously? Informed we will call her back after Dr. Sana Dominique has reviewed her results.

## 2023-03-22 NOTE — TELEPHONE ENCOUNTER
I recommend seeing Dr. Serenity Shi, general surgery, and I am going to touch base with him about the results. The lymph nodes in that area are enlarged as well and will discuss with Dr. Serenity Shi potentially getting a tissue sample    Additionally, with these lymph nodes being enlarged, I'd like her to come in for blood work whenever is convenient to update the blood counts. They were not checked with the last blood work as was not indicated but I appreciate her coming back in for this. No fasting needed.

## 2023-03-23 NOTE — TELEPHONE ENCOUNTER
[phone] spoke to pt, gave all information in Dr. Nirav Jean note below. Pt will call to schedule the CBC lab and will call to schedule consult with Dr. Ruben Hicks.

## 2023-03-24 ENCOUNTER — APPOINTMENT (OUTPATIENT)
Dept: PHYSICAL THERAPY | Facility: HOSPITAL | Age: 68
End: 2023-03-24
Attending: PHYSICIAN ASSISTANT
Payer: MEDICARE

## 2023-04-05 ENCOUNTER — OFFICE VISIT (OUTPATIENT)
Facility: LOCATION | Age: 68
End: 2023-04-05
Payer: MEDICARE

## 2023-04-05 DIAGNOSIS — R59.0 INGUINAL LYMPHADENOPATHY: Primary | ICD-10-CM

## 2023-04-05 DIAGNOSIS — K40.00 BILATERAL IRREDUCIBLE INGUINAL HERNIAS: ICD-10-CM

## 2023-04-05 PROCEDURE — 99204 OFFICE O/P NEW MOD 45 MIN: CPT | Performed by: SURGERY

## 2023-04-26 RX ORDER — MULTIVIT-MIN/IRON FUM/FOLIC AC 7.5 MG-4
1 TABLET ORAL DAILY
COMMUNITY

## 2023-04-26 RX ORDER — CYANOCOBALAMIN (VITAMIN B-12) 5000 MCG
TABLET,DISINTEGRATING ORAL DAILY PRN
COMMUNITY

## 2023-05-01 NOTE — TELEPHONE ENCOUNTER
Please call patient to check how she is doing with [facility] and let her know we have the option to go up on the dose to 7.5mg if she is doing well from a side effect perspective. Let her know she was due for follow up appointment in April.  Please schedule appt

## 2023-05-04 RX ORDER — TIRZEPATIDE 7.5 MG/.5ML
7.5 INJECTION, SOLUTION SUBCUTANEOUS WEEKLY
Qty: 9 ML | Refills: 0 | Status: SHIPPED | OUTPATIENT
Start: 2023-05-04

## 2023-05-04 NOTE — TELEPHONE ENCOUNTER
Pt stated she is doing good with the medication. Pt states she is not having any side effects besides some constipation that she treats with miralax and stool softeners, other than that she is doing well and she is ok with going up to the 7.5 mg dose if Dr. Nabila Jones believes that's best for her. Pt aware she is due for a FU, however pt stated she is scheduled for surgery for 5/15 and was told her recovery will be at least 4 weeks. Pt wants to know if she can schedule after her recovery or if Dr. Nabila Jones can squeeze her in before surgery either in office or VV. Please advise.

## 2023-05-11 ENCOUNTER — TELEPHONE (OUTPATIENT)
Facility: LOCATION | Age: 68
End: 2023-05-11

## 2023-05-13 ENCOUNTER — APPOINTMENT (OUTPATIENT)
Dept: CT IMAGING | Facility: HOSPITAL | Age: 68
End: 2023-05-13
Attending: EMERGENCY MEDICINE
Payer: MEDICARE

## 2023-05-13 ENCOUNTER — ANESTHESIA EVENT (OUTPATIENT)
Dept: SURGERY | Facility: HOSPITAL | Age: 68
End: 2023-05-13
Payer: MEDICARE

## 2023-05-13 ENCOUNTER — HOSPITAL ENCOUNTER (EMERGENCY)
Facility: HOSPITAL | Age: 68
Discharge: HOME | End: 2023-05-13
Attending: EMERGENCY MEDICINE
Payer: MEDICARE

## 2023-05-13 VITALS
TEMPERATURE: 98 F | DIASTOLIC BLOOD PRESSURE: 69 MMHG | BODY MASS INDEX: 27.31 KG/M2 | HEART RATE: 94 BPM | OXYGEN SATURATION: 98 % | SYSTOLIC BLOOD PRESSURE: 114 MMHG | WEIGHT: 160 LBS | RESPIRATION RATE: 16 BRPM | HEIGHT: 64 IN

## 2023-05-13 DIAGNOSIS — M54.50 ACUTE RIGHT-SIDED LOW BACK PAIN, UNSPECIFIED WHETHER SCIATICA PRESENT: Primary | ICD-10-CM

## 2023-05-13 LAB
ALBUMIN SERPL-MCNC: 3.7 G/DL (ref 3.4–5)
ALBUMIN/GLOB SERPL: 0.9 {RATIO} (ref 1–2)
ALP LIVER SERPL-CCNC: 56 U/L
ALT SERPL-CCNC: 23 U/L
ANION GAP SERPL CALC-SCNC: 2 MMOL/L (ref 0–18)
AST SERPL-CCNC: 32 U/L (ref 15–37)
BASOPHILS # BLD AUTO: 0.01 X10(3) UL (ref 0–0.2)
BASOPHILS NFR BLD AUTO: 0.2 %
BILIRUB SERPL-MCNC: 0.4 MG/DL (ref 0.1–2)
BILIRUB UR QL STRIP.AUTO: NEGATIVE
BUN BLD-MCNC: 11 MG/DL (ref 7–18)
CALCIUM BLD-MCNC: 8.8 MG/DL (ref 8.5–10.1)
CHLORIDE SERPL-SCNC: 109 MMOL/L (ref 98–112)
CLARITY UR REFRACT.AUTO: CLEAR
CO2 SERPL-SCNC: 24 MMOL/L (ref 21–32)
COLOR UR AUTO: YELLOW
CREAT BLD-MCNC: 0.69 MG/DL
EOSINOPHIL # BLD AUTO: 0.05 X10(3) UL (ref 0–0.7)
EOSINOPHIL NFR BLD AUTO: 0.8 %
ERYTHROCYTE [DISTWIDTH] IN BLOOD BY AUTOMATED COUNT: 12.7 %
GFR SERPLBLD BASED ON 1.73 SQ M-ARVRAT: 94 ML/MIN/1.73M2 (ref 60–?)
GLOBULIN PLAS-MCNC: 4 G/DL (ref 2.8–4.4)
GLUCOSE BLD-MCNC: 98 MG/DL (ref 70–99)
GLUCOSE UR STRIP.AUTO-MCNC: NEGATIVE MG/DL
HCT VFR BLD AUTO: 36.2 %
HGB BLD-MCNC: 12.6 G/DL
IMM GRANULOCYTES # BLD AUTO: 0.01 X10(3) UL (ref 0–1)
IMM GRANULOCYTES NFR BLD: 0.2 %
KETONES UR STRIP.AUTO-MCNC: NEGATIVE MG/DL
LYMPHOCYTES # BLD AUTO: 2.18 X10(3) UL (ref 1–4)
LYMPHOCYTES NFR BLD AUTO: 36 %
MCH RBC QN AUTO: 32.4 PG (ref 26–34)
MCHC RBC AUTO-ENTMCNC: 34.8 G/DL (ref 31–37)
MCV RBC AUTO: 93.1 FL
MONOCYTES # BLD AUTO: 0.47 X10(3) UL (ref 0.1–1)
MONOCYTES NFR BLD AUTO: 7.8 %
NEUTROPHILS # BLD AUTO: 3.34 X10 (3) UL (ref 1.5–7.7)
NEUTROPHILS # BLD AUTO: 3.34 X10(3) UL (ref 1.5–7.7)
NEUTROPHILS NFR BLD AUTO: 55 %
NITRITE UR QL STRIP.AUTO: NEGATIVE
OSMOLALITY SERPL CALC.SUM OF ELEC: 279 MOSM/KG (ref 275–295)
PH UR STRIP.AUTO: 6 [PH] (ref 5–8)
PLATELET # BLD AUTO: 298 10(3)UL (ref 150–450)
POTASSIUM SERPL-SCNC: 4.1 MMOL/L (ref 3.5–5.1)
PROT SERPL-MCNC: 7.7 G/DL (ref 6.4–8.2)
PROT UR STRIP.AUTO-MCNC: NEGATIVE MG/DL
RBC # BLD AUTO: 3.89 X10(6)UL
RBC UR QL AUTO: NEGATIVE
SODIUM SERPL-SCNC: 135 MMOL/L (ref 136–145)
SP GR UR STRIP.AUTO: 1.02 (ref 1–1.03)
UROBILINOGEN UR STRIP.AUTO-MCNC: <2 MG/DL
WBC # BLD AUTO: 6.1 X10(3) UL (ref 4–11)

## 2023-05-13 PROCEDURE — 99285 EMERGENCY DEPT VISIT HI MDM: CPT

## 2023-05-13 PROCEDURE — 87086 URINE CULTURE/COLONY COUNT: CPT | Performed by: EMERGENCY MEDICINE

## 2023-05-13 PROCEDURE — 74176 CT ABD & PELVIS W/O CONTRAST: CPT | Performed by: EMERGENCY MEDICINE

## 2023-05-13 PROCEDURE — 81001 URINALYSIS AUTO W/SCOPE: CPT | Performed by: EMERGENCY MEDICINE

## 2023-05-13 PROCEDURE — 85025 COMPLETE CBC W/AUTO DIFF WBC: CPT | Performed by: EMERGENCY MEDICINE

## 2023-05-13 PROCEDURE — 80053 COMPREHEN METABOLIC PANEL: CPT | Performed by: EMERGENCY MEDICINE

## 2023-05-13 PROCEDURE — 99284 EMERGENCY DEPT VISIT MOD MDM: CPT

## 2023-05-13 PROCEDURE — 96376 TX/PRO/DX INJ SAME DRUG ADON: CPT

## 2023-05-13 PROCEDURE — 96374 THER/PROPH/DIAG INJ IV PUSH: CPT

## 2023-05-13 RX ORDER — MORPHINE SULFATE 4 MG/ML
4 INJECTION, SOLUTION INTRAMUSCULAR; INTRAVENOUS ONCE
Status: COMPLETED | OUTPATIENT
Start: 2023-05-13 | End: 2023-05-13

## 2023-05-13 RX ORDER — MORPHINE SULFATE 4 MG/ML
4 INJECTION, SOLUTION INTRAMUSCULAR; INTRAVENOUS ONCE
Status: DISCONTINUED | OUTPATIENT
Start: 2023-05-13 | End: 2023-05-13

## 2023-05-13 RX ORDER — LIDOCAINE 50 MG/G
1 PATCH TOPICAL EVERY 24 HOURS
Qty: 10 PATCH | Refills: 0 | Status: SHIPPED | OUTPATIENT
Start: 2023-05-13 | End: 2023-05-23

## 2023-05-13 RX ORDER — MORPHINE SULFATE 4 MG/ML
INJECTION, SOLUTION INTRAMUSCULAR; INTRAVENOUS
Status: COMPLETED
Start: 2023-05-13 | End: 2023-05-13

## 2023-05-13 RX ORDER — MORPHINE SULFATE 4 MG/ML
INJECTION, SOLUTION INTRAMUSCULAR; INTRAVENOUS
Status: DISCONTINUED
Start: 2023-05-13 | End: 2023-05-13 | Stop reason: WASHOUT

## 2023-05-13 RX ORDER — DIAZEPAM 5 MG/ML
5 INJECTION, SOLUTION INTRAMUSCULAR; INTRAVENOUS ONCE
Status: COMPLETED | OUTPATIENT
Start: 2023-05-13 | End: 2023-05-13

## 2023-05-13 RX ORDER — ORPHENADRINE CITRATE 100 MG/1
100 TABLET, EXTENDED RELEASE ORAL 2 TIMES DAILY
Qty: 20 TABLET | Refills: 0 | Status: SHIPPED | OUTPATIENT
Start: 2023-05-13 | End: 2023-05-20

## 2023-05-13 NOTE — ED INITIAL ASSESSMENT (HPI)
Pt c/o lower back pain radiating down right leg. Pt states pain started last night to laura legs. Pt scheduled for hernia surgery Monday. Painful to ambulate, pain to right leg.

## 2023-05-15 ENCOUNTER — HOSPITAL ENCOUNTER (OUTPATIENT)
Facility: HOSPITAL | Age: 68
Setting detail: HOSPITAL OUTPATIENT SURGERY
Discharge: HOME | End: 2023-05-15
Attending: SURGERY | Admitting: SURGERY
Payer: MEDICARE

## 2023-05-15 ENCOUNTER — ANESTHESIA (OUTPATIENT)
Dept: SURGERY | Facility: HOSPITAL | Age: 68
End: 2023-05-15
Payer: MEDICARE

## 2023-05-15 VITALS
SYSTOLIC BLOOD PRESSURE: 123 MMHG | HEIGHT: 65 IN | TEMPERATURE: 98 F | OXYGEN SATURATION: 94 % | DIASTOLIC BLOOD PRESSURE: 73 MMHG | HEART RATE: 90 BPM | WEIGHT: 172 LBS | BODY MASS INDEX: 28.66 KG/M2 | RESPIRATION RATE: 16 BRPM

## 2023-05-15 DIAGNOSIS — K40.00 BILATERAL IRREDUCIBLE INGUINAL HERNIAS: ICD-10-CM

## 2023-05-15 DIAGNOSIS — R59.0 INGUINAL LYMPHADENOPATHY: ICD-10-CM

## 2023-05-15 LAB — GLUCOSE BLD-MCNC: 92 MG/DL (ref 70–99)

## 2023-05-15 PROCEDURE — 07BH3ZX EXCISION OF RIGHT INGUINAL LYMPHATIC, PERCUTANEOUS APPROACH, DIAGNOSTIC: ICD-10-PCS | Performed by: SURGERY

## 2023-05-15 PROCEDURE — 49650 LAP ING HERNIA REPAIR INIT: CPT

## 2023-05-15 PROCEDURE — 0YUA4JZ SUPPLEMENT BILATERAL INGUINAL REGION WITH SYNTHETIC SUBSTITUTE, PERCUTANEOUS ENDOSCOPIC APPROACH: ICD-10-PCS | Performed by: SURGERY

## 2023-05-15 PROCEDURE — 76942 ECHO GUIDE FOR BIOPSY: CPT | Performed by: ANESTHESIOLOGY

## 2023-05-15 PROCEDURE — 49650 LAP ING HERNIA REPAIR INIT: CPT | Performed by: SURGERY

## 2023-05-15 PROCEDURE — 8E0W4CZ ROBOTIC ASSISTED PROCEDURE OF TRUNK REGION, PERCUTANEOUS ENDOSCOPIC APPROACH: ICD-10-PCS | Performed by: SURGERY

## 2023-05-15 PROCEDURE — 38589 UNLISTED LAPS PX LYMPHTC SYS: CPT | Performed by: SURGERY

## 2023-05-15 DEVICE — LAPAROSCOPIC SELF-FIXATING MESH POLYESTER WITH POLYLACTIC ACID GRIPS AND COLLAGEN FILM
Type: IMPLANTABLE DEVICE | Site: ABDOMEN | Status: FUNCTIONAL
Brand: PROGRIP

## 2023-05-15 RX ORDER — GLYCOPYRROLATE 0.2 MG/ML
INJECTION, SOLUTION INTRAMUSCULAR; INTRAVENOUS AS NEEDED
Status: DISCONTINUED | OUTPATIENT
Start: 2023-05-15 | End: 2023-05-15 | Stop reason: SURG

## 2023-05-15 RX ORDER — SODIUM CHLORIDE, SODIUM LACTATE, POTASSIUM CHLORIDE, CALCIUM CHLORIDE 600; 310; 30; 20 MG/100ML; MG/100ML; MG/100ML; MG/100ML
INJECTION, SOLUTION INTRAVENOUS CONTINUOUS
Status: DISCONTINUED | OUTPATIENT
Start: 2023-05-15 | End: 2023-05-15

## 2023-05-15 RX ORDER — HEPARIN SODIUM 5000 [USP'U]/ML
5000 INJECTION, SOLUTION INTRAVENOUS; SUBCUTANEOUS ONCE
Status: COMPLETED | OUTPATIENT
Start: 2023-05-15 | End: 2023-05-15

## 2023-05-15 RX ORDER — DEXAMETHASONE SODIUM PHOSPHATE 4 MG/ML
VIAL (ML) INJECTION AS NEEDED
Status: DISCONTINUED | OUTPATIENT
Start: 2023-05-15 | End: 2023-05-15 | Stop reason: SURG

## 2023-05-15 RX ORDER — BUPIVACAINE HYDROCHLORIDE 2.5 MG/ML
INJECTION, SOLUTION EPIDURAL; INFILTRATION; INTRACAUDAL AS NEEDED
Status: DISCONTINUED | OUTPATIENT
Start: 2023-05-15 | End: 2023-05-15 | Stop reason: HOSPADM

## 2023-05-15 RX ORDER — MEPERIDINE HYDROCHLORIDE 25 MG/ML
12.5 INJECTION INTRAMUSCULAR; INTRAVENOUS; SUBCUTANEOUS AS NEEDED
Status: DISCONTINUED | OUTPATIENT
Start: 2023-05-15 | End: 2023-05-15

## 2023-05-15 RX ORDER — HYDROMORPHONE HYDROCHLORIDE 1 MG/ML
0.6 INJECTION, SOLUTION INTRAMUSCULAR; INTRAVENOUS; SUBCUTANEOUS EVERY 5 MIN PRN
Status: DISCONTINUED | OUTPATIENT
Start: 2023-05-15 | End: 2023-05-15

## 2023-05-15 RX ORDER — KETOROLAC TROMETHAMINE 30 MG/ML
INJECTION, SOLUTION INTRAMUSCULAR; INTRAVENOUS AS NEEDED
Status: DISCONTINUED | OUTPATIENT
Start: 2023-05-15 | End: 2023-05-15 | Stop reason: SURG

## 2023-05-15 RX ORDER — ONDANSETRON 2 MG/ML
INJECTION INTRAMUSCULAR; INTRAVENOUS AS NEEDED
Status: DISCONTINUED | OUTPATIENT
Start: 2023-05-15 | End: 2023-05-15 | Stop reason: SURG

## 2023-05-15 RX ORDER — ACETAMINOPHEN 10 MG/ML
1000 INJECTION, SOLUTION INTRAVENOUS ONCE AS NEEDED
Status: DISCONTINUED | OUTPATIENT
Start: 2023-05-15 | End: 2023-05-15

## 2023-05-15 RX ORDER — LIDOCAINE HYDROCHLORIDE 10 MG/ML
INJECTION, SOLUTION EPIDURAL; INFILTRATION; INTRACAUDAL; PERINEURAL AS NEEDED
Status: DISCONTINUED | OUTPATIENT
Start: 2023-05-15 | End: 2023-05-15 | Stop reason: SURG

## 2023-05-15 RX ORDER — CEFAZOLIN SODIUM/WATER 2 G/20 ML
SYRINGE (ML) INTRAVENOUS
Status: DISCONTINUED
Start: 2023-05-15 | End: 2023-05-15

## 2023-05-15 RX ORDER — HYDROMORPHONE HYDROCHLORIDE 1 MG/ML
0.4 INJECTION, SOLUTION INTRAMUSCULAR; INTRAVENOUS; SUBCUTANEOUS EVERY 5 MIN PRN
Status: DISCONTINUED | OUTPATIENT
Start: 2023-05-15 | End: 2023-05-15

## 2023-05-15 RX ORDER — MIDAZOLAM HYDROCHLORIDE 1 MG/ML
1 INJECTION INTRAMUSCULAR; INTRAVENOUS EVERY 5 MIN PRN
Status: DISCONTINUED | OUTPATIENT
Start: 2023-05-15 | End: 2023-05-15

## 2023-05-15 RX ORDER — METOCLOPRAMIDE HYDROCHLORIDE 5 MG/ML
10 INJECTION INTRAMUSCULAR; INTRAVENOUS EVERY 8 HOURS PRN
Status: DISCONTINUED | OUTPATIENT
Start: 2023-05-15 | End: 2023-05-15

## 2023-05-15 RX ORDER — DIPHENHYDRAMINE HYDROCHLORIDE 50 MG/ML
12.5 INJECTION INTRAMUSCULAR; INTRAVENOUS AS NEEDED
Status: DISCONTINUED | OUTPATIENT
Start: 2023-05-15 | End: 2023-05-15

## 2023-05-15 RX ORDER — NALOXONE HYDROCHLORIDE 0.4 MG/ML
80 INJECTION, SOLUTION INTRAMUSCULAR; INTRAVENOUS; SUBCUTANEOUS AS NEEDED
Status: DISCONTINUED | OUTPATIENT
Start: 2023-05-15 | End: 2023-05-15

## 2023-05-15 RX ORDER — HYDROMORPHONE HYDROCHLORIDE 1 MG/ML
INJECTION, SOLUTION INTRAMUSCULAR; INTRAVENOUS; SUBCUTANEOUS
Status: COMPLETED
Start: 2023-05-15 | End: 2023-05-15

## 2023-05-15 RX ORDER — ACETAMINOPHEN 500 MG
1000 TABLET ORAL ONCE
Status: DISCONTINUED | OUTPATIENT
Start: 2023-05-15 | End: 2023-05-15 | Stop reason: HOSPADM

## 2023-05-15 RX ORDER — HYDROMORPHONE HYDROCHLORIDE 1 MG/ML
0.2 INJECTION, SOLUTION INTRAMUSCULAR; INTRAVENOUS; SUBCUTANEOUS EVERY 5 MIN PRN
Status: DISCONTINUED | OUTPATIENT
Start: 2023-05-15 | End: 2023-05-15

## 2023-05-15 RX ORDER — NEOSTIGMINE METHYLSULFATE 1 MG/ML
INJECTION, SOLUTION INTRAVENOUS AS NEEDED
Status: DISCONTINUED | OUTPATIENT
Start: 2023-05-15 | End: 2023-05-15 | Stop reason: SURG

## 2023-05-15 RX ORDER — CEFAZOLIN SODIUM/WATER 2 G/20 ML
2 SYRINGE (ML) INTRAVENOUS ONCE
Status: COMPLETED | OUTPATIENT
Start: 2023-05-15 | End: 2023-05-15

## 2023-05-15 RX ORDER — ROCURONIUM BROMIDE 10 MG/ML
INJECTION, SOLUTION INTRAVENOUS AS NEEDED
Status: DISCONTINUED | OUTPATIENT
Start: 2023-05-15 | End: 2023-05-15 | Stop reason: SURG

## 2023-05-15 RX ORDER — HEPARIN SODIUM 5000 [USP'U]/ML
INJECTION, SOLUTION INTRAVENOUS; SUBCUTANEOUS
Status: COMPLETED
Start: 2023-05-15 | End: 2023-05-15

## 2023-05-15 RX ORDER — MELOXICAM 15 MG/1
15 TABLET ORAL DAILY
Qty: 10 TABLET | Refills: 0 | Status: SHIPPED | OUTPATIENT
Start: 2023-05-15

## 2023-05-15 RX ORDER — OXYCODONE HYDROCHLORIDE 5 MG/1
5 TABLET ORAL EVERY 8 HOURS PRN
Qty: 10 TABLET | Refills: 0 | Status: SHIPPED | OUTPATIENT
Start: 2023-05-15

## 2023-05-15 RX ORDER — ONDANSETRON 2 MG/ML
4 INJECTION INTRAMUSCULAR; INTRAVENOUS EVERY 6 HOURS PRN
Status: DISCONTINUED | OUTPATIENT
Start: 2023-05-15 | End: 2023-05-15

## 2023-05-15 RX ADMIN — ONDANSETRON 4 MG: 2 INJECTION INTRAMUSCULAR; INTRAVENOUS at 13:26:00

## 2023-05-15 RX ADMIN — SODIUM CHLORIDE, SODIUM LACTATE, POTASSIUM CHLORIDE, CALCIUM CHLORIDE: 600; 310; 30; 20 INJECTION, SOLUTION INTRAVENOUS at 14:33:00

## 2023-05-15 RX ADMIN — LIDOCAINE HYDROCHLORIDE 50 MG: 10 INJECTION, SOLUTION EPIDURAL; INFILTRATION; INTRACAUDAL; PERINEURAL at 12:45:00

## 2023-05-15 RX ADMIN — SODIUM CHLORIDE, SODIUM LACTATE, POTASSIUM CHLORIDE, CALCIUM CHLORIDE: 600; 310; 30; 20 INJECTION, SOLUTION INTRAVENOUS at 12:40:00

## 2023-05-15 RX ADMIN — ROCURONIUM BROMIDE 50 MG: 10 INJECTION, SOLUTION INTRAVENOUS at 12:45:00

## 2023-05-15 RX ADMIN — SODIUM CHLORIDE, SODIUM LACTATE, POTASSIUM CHLORIDE, CALCIUM CHLORIDE: 600; 310; 30; 20 INJECTION, SOLUTION INTRAVENOUS at 13:15:00

## 2023-05-15 RX ADMIN — NEOSTIGMINE METHYLSULFATE 2 MG: 1 INJECTION, SOLUTION INTRAVENOUS at 14:33:00

## 2023-05-15 RX ADMIN — KETOROLAC TROMETHAMINE 30 MG: 30 INJECTION, SOLUTION INTRAMUSCULAR; INTRAVENOUS at 14:33:00

## 2023-05-15 RX ADMIN — GLYCOPYRROLATE 0.4 MG: 0.2 INJECTION, SOLUTION INTRAMUSCULAR; INTRAVENOUS at 14:33:00

## 2023-05-15 RX ADMIN — CEFAZOLIN SODIUM/WATER 2 G: 2 G/20 ML SYRINGE (ML) INTRAVENOUS at 12:49:00

## 2023-05-15 RX ADMIN — DEXAMETHASONE SODIUM PHOSPHATE 4 MG: 4 MG/ML VIAL (ML) INJECTION at 13:26:00

## 2023-05-15 NOTE — BRIEF OP NOTE
Pre-Operative Diagnosis: Inguinal lymphadenopathy [R59.0]  Bilateral irreducible inguinal hernias [K40.00]     Post-Operative Diagnosis: Inguinal lymphadenopathy [R59. 0]Bilateral irreducible inguinal hernias [K40.00]      Procedure Performed:   ROBOTIC LAPAROSCOPIC BILATERAL INGUINAL HERNIA REPAIR WITH MESH AND LYMPH NODE BIOPSY, INGUINAL LYMPH NODE BIOPSY    Surgeon(s) and Role:     * Perry Hood MD - Primary    Assistant(s):  PA: ROXANA Hilario     Surgical Findings: Bilateral incarcerated inguinal hernias. Inguinal lymphadenopathy. Peritoneal nodules      Specimen: peritoneal nodules; right inguinal lymph node.      Estimated Blood Loss: 5 ml    Dictation Number:      Jennifer Skaggs MD  5/15/2023  2:27 PM

## 2023-05-15 NOTE — ANESTHESIA PROCEDURE NOTES
Regional Block    Date/Time: 5/15/2023 2:35 PM    Performed by: Christian Anderson MD  Authorized by: Christian Anderson MD      General Information and Staff    Start Time:  5/15/2023 2:35 PM  End Time:  5/15/2023 2:41 PM  Anesthesiologist:  Christian Anderson MD  Performed by: Anesthesiologist  Patient Location:  OR      Site Identification: real time ultrasound guided and image stored and retrievable    Block site/laterality marked before start: site marked  Reason for Block: at surgeon's request and post-op pain management    Preanesthetic Checklist: 2 patient identifers, IV checked, risks and benefits discussed, monitors and equipment checked, pre-op evaluation, timeout performed, anesthesia consent, sterile technique used, no prohibitive neurological deficits and no local skin infection at insertion site      Procedure Details    Patient Position:   Prep: ChloraPrep    Monitoring:  Cardiac monitor, continuous pulse ox and blood pressure cuff  Laterality:  Bilateral  Injection Technique:  Single-shot    Needle    Needle Type:  Short-bevel and echogenic  Needle Localization:  Ultrasound guidance  Reason for Ultrasound Use: appropriate spread of the medication was noted in real time and no ultrasound evidence of intravascular and/or intraneural injection            Assessment    Injection Assessment:  Good spread noted, negative resistance, negative aspiration for heme, incremental injection and low pressure  Heart Rate Change: No    - Patient tolerated block procedure well without evidence of immediate block related complications.      Medications  5/15/2023 2:35 PM      Additional Comments    Medication:  Bupivacaine 0.25% 20mLbl

## 2023-05-15 NOTE — OPERATIVE REPORT
OPERATIVE REPORT    PREOPERATIVE DIAGNOSIS:  Bilateral inguinal hernia. Inguinal lymphadenopathy  POSTOPERATIVE DIAGNOSIS: Bilateral incarcerated inguinal hernia. Inguinal lymphadenopathy  PROCEDURE PERFORMED: Robotic bilateral incarcerated inguinal hernia repair with mesh ( ProGrip mesh used); biopsy peritoneal nodules. Inguinal lymph node biopsy. ASSISTANT: Ms Mabel Marin    ANESTHESIA: General endotracheal anesthesia. Anesthesiologist.: Juan M Virk MD; Zain Aguilar MD    BLOOD LOSS: Less than 5 mL. COMPLICATIONS: None apparent. IMPLANTS: Mesh as above. FINDINGS: Bilateral fat-containing incarcerated inguinal hernias. Peritoneal implants in the pelvis. Bilateral inguinal lymphadenopathy. COMPLICATIONS: None apparent. SPECIMENS: #1 peritoneal implants. #2 right femoral lymph node. DISPOSITION: The patient was awakened from anesthesia and brought to the recovery room in stable condition. He tolerated the procedure without apparent complication. Needle, sponge, instrument counts were correct at the end of procedure. Please note preprocedure antibiotic and DVT prophylaxis administered per protocol and a time-out were performed prior to initiating the procedure identifying the correct patient, procedure, surgeon, and sites of surgery. I indicated the sites of surgery in the preoperative holding area and the patient concurred. INDICATIONS: Please see the preprocedure history and physical. Briefly, the patient is a  69year old female with a painful bulge of the inguinal region. Physical examination is consistent with inguinal hernia. The risks, benefits, and alternatives of robotic inguinal hernia repair were explained to the patient. The risks explained included but were not limited to bleeding, infection, recurrence, injury to adjacent organs and structures. The need for therapeutic, diagnostic for surgical intervention was also discussed with the patient.  The patient voiced understanding. All pertinent questions were answered to the patient's satisfaction after which the patient provided willing and informed consent to proceed with surgery. PROCEDURE: The patient was brought to the operating room, and after induction of general endotracheal anesthesia, the anesthesiologist performed an TAP block. Next, the abdomen was prepped and draped in usual sterile fashion. The patient was placed in Trendelenburg position. The umbilicus was grasped, elevated with an Allis clamp and 2 perforating towel clips, and an 8-mm incision was created cephalad to the umbilicus. Through this a Veress needle was introduced into the abdomen and pneumoperitoneum was established in usual manner. An 8-mm DaVinci Visi-Port trocar was then placed under direct vision followed by a laparoscope. Diagnostic survey of the abdomen revealed no other acute pathology or iatrogenic injury. Attention was turned to the inguinal region where the hernia defects were identified. There are 2 small peritoneal implants in the area of prior surgery. Sharply dissected from their peritoneal attachments and removed and submitted a specimen. Once instruments were docked these areas were bluntly at this point, two 8 mm trocars were placed on either side of the abdomen in the midclavicular line under direct vision. Next, the robot was then docked and instruments placed under direct vision. Repair of the hernias was completed in similar fashion bilaterally. On the right side, the femoral lymphadenopathy is grasped and sharply dissected free from surrounding tissues with judicious use of electrocautery to achieve hemostasis. The peritoneum was grasped, retracted, and divided . Once the peritoneum was incised, blunt and sharp dissection was used with judicious use of electrocautery to achieve hemostasis.  Medial to lateral dissection was accomplished identifying initially the pubic tubercle and Nasir's ligament and the soft tissues were dissected laterally to create a sufficient pocket to accommodate mesh. The femoral lymph node was then removed from the operative field and submitted to pathology for further evaluation. Next, the hernia sac was grasped and retracted. Blunt and sharp dissection technique was utilized to reduce the hernia sac into the abdominal cavity. Once this was accomplished, a ProGrip  mesh was placed in the inguinal region. Once the mesh was in place, the peritoneal flap was then reapproximated in anatomic position and secured using running 2-0 V-Loc suture. At the completion of the operation, all instruments were removed. Needle, sponge, instrument counts were correct. Pneumoperitoneum was released and all instruments had been removed under direct vision as well. The skin incisions are closed with 4-0 Monocryl suture. Dermabond was used to seal the incisions. The anesthesiologist performed a TAP block. The patient was awakened from anesthesia, brought to the recovery room in stable condition having tolerated procedure without apparent complication. Needle, sponge, instrument counts correct at the end of procedure and operative findings were discussed with the patient's family.

## 2023-05-15 NOTE — ANESTHESIA PROCEDURE NOTES
Airway  Date/Time: 5/15/2023 12:48 PM  Urgency: elective      General Information and Staff    Patient location during procedure: OR  Anesthesiologist: Neelam Dias MD  Performed: anesthesiologist   Performed by: Neelam Dias MD  Authorized by: Neelam Dias MD      Indications and Patient Condition  Indications for airway management: anesthesia  Sedation level: deep  Preoxygenated: yes  Patient position: sniffing  Mask difficulty assessment: 1 - vent by mask    Final Airway Details  Final airway type: endotracheal airway      Successful airway: ETT  Cuffed: yes   Successful intubation technique: direct laryngoscopy  Facilitating devices/methods: intubating stylet and cricoid pressure  Endotracheal tube insertion site: oral  Blade: Verenice  Blade size: #3  ETT size (mm): 7.0    Cormack-Lehane Classification: grade IIA - partial view of glottis  Placement verified by: chest auscultation and capnometry   Cuff volume (mL): 7  Measured from: lips  ETT to lips (cm): 21  Number of attempts at approach: 1  Ventilation between attempts: none  Number of other approaches attempted: 0

## 2023-05-16 ENCOUNTER — PATIENT OUTREACH (OUTPATIENT)
Dept: CASE MANAGEMENT | Age: 68
End: 2023-05-16

## 2023-05-16 NOTE — PROGRESS NOTES
1st attempt ED [facility] apt request  PCP -decline, pt stated she is still sore from surgery and doesn't want to make a 1 week follow up apt   Closing encounter

## 2023-05-31 ENCOUNTER — OFFICE VISIT (OUTPATIENT)
Facility: LOCATION | Age: 68
End: 2023-05-31

## 2023-05-31 VITALS — TEMPERATURE: 98 F | HEART RATE: 108 BPM

## 2023-05-31 DIAGNOSIS — R21 RASH AND NONSPECIFIC SKIN ERUPTION: Primary | ICD-10-CM

## 2023-05-31 DIAGNOSIS — R59.0 INGUINAL LYMPHADENOPATHY: ICD-10-CM

## 2023-05-31 DIAGNOSIS — K40.00 BILATERAL IRREDUCIBLE INGUINAL HERNIAS: ICD-10-CM

## 2023-05-31 PROCEDURE — 1160F RVW MEDS BY RX/DR IN RCRD: CPT | Performed by: SURGERY

## 2023-05-31 PROCEDURE — 99024 POSTOP FOLLOW-UP VISIT: CPT | Performed by: SURGERY

## 2023-05-31 PROCEDURE — 1159F MED LIST DOCD IN RCRD: CPT | Performed by: SURGERY

## 2023-05-31 RX ORDER — METHYLPREDNISOLONE 4 MG/1
TABLET ORAL
Qty: 1 EACH | Refills: 0 | Status: SHIPPED | OUTPATIENT
Start: 2023-05-31

## 2023-05-31 NOTE — PATIENT INSTRUCTIONS
Please make an appointment to see your primary care physician to follow-up on your skin rash. Also try to make an appointment with an allergist to determine the nature of your allergic reaction.

## 2023-06-14 ENCOUNTER — TELEPHONE (OUTPATIENT)
Facility: LOCATION | Age: 68
End: 2023-06-14

## 2023-06-14 NOTE — TELEPHONE ENCOUNTER
Pt broke out around her incicion, showed PBP at her postop appointment on 5/31. She has finished the steroids that the Dr prescribed, and bought the otc cream that the Dr recommended, and she is still very itchy and has little red bumps all over her incisions and up her chest. She says that PBP thought it could be a reaction to the glue, but she's washed herself off and she is still very itchy. Says she can't sleep due to the itchy. The steroids didn't help. She bought benadryl cream as well. She would like to know what to do.      Please advise  Best callback number is [phone]

## 2023-06-19 ENCOUNTER — TELEPHONE (OUTPATIENT)
Dept: INTERNAL MEDICINE CLINIC | Facility: CLINIC | Age: 68
End: 2023-06-19

## 2023-06-19 DIAGNOSIS — R21 RASH: Primary | ICD-10-CM

## 2023-06-19 NOTE — TELEPHONE ENCOUNTER
Patient calling because she'd like to know if Dr. Abraham Reyes can refer her to a dermatologist or does she need to come in? She broke out in a rash after surgery. Did a round of steroids and has been using otc benadryl cream and nothing is helping. She states she is having trouble sleeping from the itching.

## 2023-06-19 NOTE — TELEPHONE ENCOUNTER
Spoke to pt, gave information for Dr. Apryl Casas but explained it may take about a week for [facility] to authorized this referral. Pt v/u - she will try to call and see if they will let her make an appt or not. RN informed pt that we will call her back when referral is authorized. Pt said the itching is awful, she has tried steroid burst, oatmeal-based cream, topical Benadryl, nothing has stopped the itching.

## 2023-07-25 ENCOUNTER — TELEPHONE (OUTPATIENT)
Dept: INTERNAL MEDICINE CLINIC | Facility: CLINIC | Age: 68
End: 2023-07-25

## 2023-07-25 NOTE — TELEPHONE ENCOUNTER
Protocol FAILED    Requesting: Tirzepatide [facility]) 7.5 MG/0.5ML Subcutaneous Solution Pen-injector     LOV: 3/6/23  RTC: 1 month  Filled: 5/4/23 9mL  Recent Labs: 5/15/23    Upcoming OV   No future appointments.

## 2023-07-26 RX ORDER — TIRZEPATIDE 7.5 MG/.5ML
7.5 INJECTION, SOLUTION SUBCUTANEOUS WEEKLY
Qty: 6 ML | Refills: 0 | Status: SHIPPED | OUTPATIENT
Start: 2023-07-26

## 2023-07-26 NOTE — TELEPHONE ENCOUNTER
She is overdue for her follow up appointment for medication management; please call to schedule (one month follow up appt was recommended at last appt in March)

## 2023-07-26 NOTE — TELEPHONE ENCOUNTER
Contacted Patient to Schedule 1 [facility] F/U. ..   Appointment Has been Scheduled for:    Future Appointments   Date Time Provider Uzair Gayatri   8/7/2023 11:30 AM Authur Lanes, MD [facility] [place]

## 2023-09-20 ENCOUNTER — OFFICE VISIT (OUTPATIENT)
Dept: INTERNAL MEDICINE CLINIC | Facility: CLINIC | Age: 68
End: 2023-09-20
Payer: MEDICARE

## 2023-09-20 VITALS
BODY MASS INDEX: 27.52 KG/M2 | DIASTOLIC BLOOD PRESSURE: 60 MMHG | HEART RATE: 84 BPM | WEIGHT: 165.19 LBS | OXYGEN SATURATION: 98 % | HEIGHT: 65 IN | TEMPERATURE: 98 F | RESPIRATION RATE: 16 BRPM | SYSTOLIC BLOOD PRESSURE: 108 MMHG

## 2023-09-20 DIAGNOSIS — M81.0 AGE-RELATED OSTEOPOROSIS WITHOUT CURRENT PATHOLOGICAL FRACTURE: ICD-10-CM

## 2023-09-20 DIAGNOSIS — E78.00 HYPERCHOLESTEROLEMIA: Primary | ICD-10-CM

## 2023-09-20 DIAGNOSIS — R73.03 PRE-DIABETES: ICD-10-CM

## 2023-09-20 LAB
CARTRIDGE LOT#: 607 NUMERIC
HEMOGLOBIN A1C: 5.7 % (ref 4.3–5.6)

## 2023-09-20 PROCEDURE — 99213 OFFICE O/P EST LOW 20 MIN: CPT | Performed by: INTERNAL MEDICINE

## 2023-09-20 PROCEDURE — 3008F BODY MASS INDEX DOCD: CPT | Performed by: INTERNAL MEDICINE

## 2023-09-20 PROCEDURE — 1159F MED LIST DOCD IN RCRD: CPT | Performed by: INTERNAL MEDICINE

## 2023-09-20 PROCEDURE — 3078F DIAST BP <80 MM HG: CPT | Performed by: INTERNAL MEDICINE

## 2023-09-20 PROCEDURE — 1160F RVW MEDS BY RX/DR IN RCRD: CPT | Performed by: INTERNAL MEDICINE

## 2023-09-20 PROCEDURE — 83036 HEMOGLOBIN GLYCOSYLATED A1C: CPT | Performed by: INTERNAL MEDICINE

## 2023-09-20 PROCEDURE — 3074F SYST BP LT 130 MM HG: CPT | Performed by: INTERNAL MEDICINE

## 2023-09-20 RX ORDER — CLINDAMYCIN PHOSPHATE 10 UG/ML
LOTION TOPICAL
COMMUNITY
Start: 2023-08-30

## 2023-09-20 RX ORDER — MOMETASONE FUROATE 1 MG/G
OINTMENT TOPICAL
COMMUNITY
Start: 2023-08-30 | End: 2023-09-20

## 2023-09-20 RX ORDER — TIRZEPATIDE 10 MG/.5ML
10 INJECTION, SOLUTION SUBCUTANEOUS WEEKLY
Qty: 6 ML | Refills: 1 | Status: SHIPPED | OUTPATIENT
Start: 2023-09-20 | End: 2023-12-19

## 2023-09-20 RX ORDER — DOXYCYCLINE HYCLATE 100 MG/1
CAPSULE ORAL
COMMUNITY
Start: 2023-06-23 | End: 2023-09-20

## 2023-10-03 ENCOUNTER — NURSE TRIAGE (OUTPATIENT)
Dept: INTERNAL MEDICINE CLINIC | Facility: CLINIC | Age: 68
End: 2023-10-03

## 2023-10-03 DIAGNOSIS — H60.333 ACUTE SWIMMER'S EAR OF BOTH SIDES: Primary | ICD-10-CM

## 2023-10-03 NOTE — TELEPHONE ENCOUNTER
LS - referral to ENT pended, if appropriate, please advise. Ty!    [phone] LMTCB #1 - stated we are working on requested referral, but wanted to assist pt while waiting for the authorization. Asked pt to call office back.

## 2023-10-03 NOTE — TELEPHONE ENCOUNTER
Pt called requesting a referral to an ENT doctor    Pt stated she has been dealing with water in her ears for about a week and would like to see an ENT for relief    Pt informed ok to leave a vm if she doesn't answer call

## 2023-10-09 NOTE — TELEPHONE ENCOUNTER
Referral has been signed by JOHN, is in open status. Pt returned call and RN triaged further. Reason for Disposition   Patient wants to be seen    Answer Assessment - Initial Assessment Questions  1. LOCATION: \"Which ear is involved? \"       bilateral  2. SYMPTOMS: \"What are the main symptoms? \" (e.g., fullness, decreased hearing, itching, discomfort)      Decreased hearing, some minimal drainage  3. ONSET: \"When did the  hearing loss  start?\"      1 week ago  4. PAIN: \"Is there any earache? \" \"How bad is it? \"  (Scale 1-10; or mild, moderate, severe)      Patient denies any pain  5. OBJECTS: \"Do you use cotton swabs (Q-tips) in your ear? \" \"Have you put anything else in your ear? \"      Yes, patient used ear wax remover drops, then tried using Q-tips when the beginning of decreased hearing began. 6. EARWAX HISTORY: \"Have you had problems with earwax before? \" If Yes, ask: \"What did you do the last time? \"      No - willing to see Dr. Raven Tracey prior to ENT    Protocols used: [address] [place] [address]    Action Requested: Summary for Provider     []  Critical Lab, Recommendations Needed  [] Need Additional Advice  [x]   FYI    []   Need Orders  [] Need Medications Sent to Pharmacy  []  Other     SUMMARY: Pt informed that the requested referral to ENT has been placed, awaiting authorization from [facility]. RN recommended seeing Dr. Raven Tracey to examine ears, pt may have pushed softened wax back into ears.      Reason for call: Referral (ENT) triage for urgent ear issue  Onset: bilateral hearing decreased about 1 week ago

## 2023-10-11 ENCOUNTER — OFFICE VISIT (OUTPATIENT)
Dept: INTERNAL MEDICINE CLINIC | Facility: CLINIC | Age: 68
End: 2023-10-11
Payer: MEDICARE

## 2023-10-11 VITALS
TEMPERATURE: 98 F | HEART RATE: 88 BPM | BODY MASS INDEX: 26.72 KG/M2 | RESPIRATION RATE: 16 BRPM | DIASTOLIC BLOOD PRESSURE: 62 MMHG | WEIGHT: 160.38 LBS | OXYGEN SATURATION: 99 % | SYSTOLIC BLOOD PRESSURE: 110 MMHG | HEIGHT: 65 IN

## 2023-10-11 DIAGNOSIS — H61.21 IMPACTED CERUMEN OF RIGHT EAR: Primary | ICD-10-CM

## 2023-10-11 PROCEDURE — 1160F RVW MEDS BY RX/DR IN RCRD: CPT | Performed by: INTERNAL MEDICINE

## 2023-10-11 PROCEDURE — 3074F SYST BP LT 130 MM HG: CPT | Performed by: INTERNAL MEDICINE

## 2023-10-11 PROCEDURE — 3008F BODY MASS INDEX DOCD: CPT | Performed by: INTERNAL MEDICINE

## 2023-10-11 PROCEDURE — 1159F MED LIST DOCD IN RCRD: CPT | Performed by: INTERNAL MEDICINE

## 2023-10-11 PROCEDURE — 99213 OFFICE O/P EST LOW 20 MIN: CPT | Performed by: INTERNAL MEDICINE

## 2023-10-11 PROCEDURE — 3078F DIAST BP <80 MM HG: CPT | Performed by: INTERNAL MEDICINE

## 2023-11-10 ENCOUNTER — TELEPHONE (OUTPATIENT)
Dept: INTERNAL MEDICINE CLINIC | Facility: CLINIC | Age: 68
End: 2023-11-10

## 2023-11-10 NOTE — TELEPHONE ENCOUNTER
Wade Leon from [address] [place] [address] called in regards to faxing over a drug therapy list to our office on dates 10/26 and 11/3    I informed we didn't receive anything     Fawn Escobar our fax #    Kadcljimit May will be re-faxing the list to us    Please look out for fax ty

## 2024-01-22 DIAGNOSIS — R73.03 PRE-DIABETES: ICD-10-CM

## 2024-01-22 RX ORDER — TIRZEPATIDE 10 MG/.5ML
10 INJECTION, SOLUTION SUBCUTANEOUS WEEKLY
Qty: 6 ML | Refills: 1 | Status: SHIPPED | OUTPATIENT
Start: 2024-01-22 | End: 2024-04-21

## 2024-01-22 NOTE — TELEPHONE ENCOUNTER
Called patient and she's on 10mg   Scheduled MAS for 3/22     LOV: 10/11/23   RTC: none noted   Filled: 9/20/23 # 6mL 1 refill   Labs: 3/9/23   Future Appointments   Date Time Provider Department Center   3/22/2024  1:00 PM Maria Del Rosario Razo MD [facility] [place]

## 2024-01-22 NOTE — TELEPHONE ENCOUNTER
Refill needed [id number]    MOUNJARO 7.5 MG/0.5ML Subcutaneous Solution Pen-injector

## 2024-01-23 ENCOUNTER — TELEPHONE (OUTPATIENT)
Dept: INTERNAL MEDICINE CLINIC | Facility: CLINIC | Age: 69
End: 2024-01-23

## 2024-01-23 NOTE — TELEPHONE ENCOUNTER
Incoming fax from bucky [employer]T required for Mounjaro     Initiated through cover my meds     Key: [id number]    Awaiting payer response

## 2024-01-24 NOTE — TELEPHONE ENCOUNTER
Please let patient know that Mounjaro is no longer covered by her insurance unless she has a diagnosis of diabetes; not covered for pre-diabetes.     We can substitute it for the same medication, branded for use for weight loss, called Zepbound. Coverage would depend on whether her plan covers weight loss medications. All of the other injectable medications are not covered for pre-diabetes indication

## 2024-01-24 NOTE — TELEPHONE ENCOUNTER
Called patient and she stated that her insurance has been paying for the mounjaro for quite some time now   She's not sure what changed   She stated she will call her insurance to inquire about it and give us a call back

## 2024-01-25 NOTE — TELEPHONE ENCOUNTER
Patient called back and stated that she spoke with her insurance   They are requesting clinical notes showing that this medication is medically necessary     Phone: [phone]  Fax: [phone]     Will discuss with LS upon her return tomorrow

## 2024-01-26 NOTE — TELEPHONE ENCOUNTER
Letter faxed to [facility]   Confirmation received     Awaiting payer response     Patient informed

## 2024-01-26 NOTE — TELEPHONE ENCOUNTER
Letter printed as an appeal to insurance.     Please let patient know I wrote a letter to insurance, but I don't expect it to be covered as now in 2024, insurances are only covering for diagnosis of diabetes, which she does not have.     Currently, her BMI is too low to initiate treatment with Zepbound, which is the same medication as Mounjaro, but is branded for treatment of obesity. She is at 26.69, and BMI has to be 27 or above.

## 2024-01-30 NOTE — TELEPHONE ENCOUNTER
Incoming fax from [employer] was denied   Patient was informed   She will discuss with you at her appt on 2/14     Future Appointments   Date Time Provider Department Center   2/14/2024  1:30 PM Maria Del Rosario Razo MD [facility] [place]   3/22/2024  1:00 PM Maria Del Rosario Razo MD [facility] [place]

## 2024-02-14 ENCOUNTER — LAB ENCOUNTER (OUTPATIENT)
Dept: LAB | Age: 69
End: 2024-02-14
Attending: INTERNAL MEDICINE
Payer: MEDICARE

## 2024-02-14 ENCOUNTER — OFFICE VISIT (OUTPATIENT)
Dept: INTERNAL MEDICINE CLINIC | Facility: CLINIC | Age: 69
End: 2024-02-14
Payer: MEDICARE

## 2024-02-14 VITALS
WEIGHT: 159.63 LBS | DIASTOLIC BLOOD PRESSURE: 62 MMHG | RESPIRATION RATE: 16 BRPM | BODY MASS INDEX: 26.6 KG/M2 | SYSTOLIC BLOOD PRESSURE: 110 MMHG | HEIGHT: 65 IN | HEART RATE: 94 BPM | TEMPERATURE: 98 F | OXYGEN SATURATION: 98 %

## 2024-02-14 DIAGNOSIS — E55.9 HYPOVITAMINOSIS D: ICD-10-CM

## 2024-02-14 DIAGNOSIS — Z00.00 WELLNESS EXAMINATION: Primary | ICD-10-CM

## 2024-02-14 DIAGNOSIS — M81.0 AGE-RELATED OSTEOPOROSIS WITHOUT CURRENT PATHOLOGICAL FRACTURE: ICD-10-CM

## 2024-02-14 DIAGNOSIS — R73.03 PRE-DIABETES: ICD-10-CM

## 2024-02-14 DIAGNOSIS — L90.0 LICHEN SCLEROSUS: ICD-10-CM

## 2024-02-14 DIAGNOSIS — Z98.890 S/P BILATERAL INGUINAL HERNIA REPAIR: ICD-10-CM

## 2024-02-14 DIAGNOSIS — Z12.31 SCREENING MAMMOGRAM FOR BREAST CANCER: ICD-10-CM

## 2024-02-14 DIAGNOSIS — E78.00 HYPERCHOLESTEROLEMIA: ICD-10-CM

## 2024-02-14 DIAGNOSIS — R59.9 ENLARGED LYMPH NODES: ICD-10-CM

## 2024-02-14 DIAGNOSIS — Z87.19 S/P BILATERAL INGUINAL HERNIA REPAIR: ICD-10-CM

## 2024-02-14 PROBLEM — R59.0 INGUINAL LYMPHADENOPATHY: Status: RESOLVED | Noted: 2023-03-07 | Resolved: 2024-02-14

## 2024-02-14 PROBLEM — R10.32 LEFT INGUINAL PAIN: Status: RESOLVED | Noted: 2022-07-18 | Resolved: 2024-02-14

## 2024-02-14 PROBLEM — E66.9 OBESITY (BMI 30-39.9): Status: RESOLVED | Noted: 2023-03-07 | Resolved: 2024-02-14

## 2024-02-14 PROBLEM — F51.04 CHRONIC INSOMNIA: Status: RESOLVED | Noted: 2018-03-15 | Resolved: 2024-02-14

## 2024-02-14 PROBLEM — R39.15 URINARY URGENCY: Status: RESOLVED | Noted: 2023-03-07 | Resolved: 2024-02-14

## 2024-02-14 LAB
ALBUMIN SERPL-MCNC: 3.5 G/DL (ref 3.4–5)
ALBUMIN/GLOB SERPL: 0.9 {RATIO} (ref 1–2)
ALP LIVER SERPL-CCNC: 62 U/L
ALT SERPL-CCNC: 30 U/L
ANION GAP SERPL CALC-SCNC: 4 MMOL/L (ref 0–18)
AST SERPL-CCNC: 32 U/L (ref 15–37)
BASOPHILS # BLD AUTO: 0.01 X10(3) UL (ref 0–0.2)
BASOPHILS NFR BLD AUTO: 0.2 %
BILIRUB SERPL-MCNC: 0.3 MG/DL (ref 0.1–2)
BUN BLD-MCNC: 13 MG/DL (ref 9–23)
CALCIUM BLD-MCNC: 9.2 MG/DL (ref 8.5–10.1)
CHLORIDE SERPL-SCNC: 108 MMOL/L (ref 98–112)
CHOLEST SERPL-MCNC: 208 MG/DL (ref ?–200)
CO2 SERPL-SCNC: 30 MMOL/L (ref 21–32)
CREAT BLD-MCNC: 0.7 MG/DL
EGFRCR SERPLBLD CKD-EPI 2021: 93 ML/MIN/1.73M2 (ref 60–?)
EOSINOPHIL # BLD AUTO: 0.03 X10(3) UL (ref 0–0.7)
EOSINOPHIL NFR BLD AUTO: 0.7 %
ERYTHROCYTE [DISTWIDTH] IN BLOOD BY AUTOMATED COUNT: 13.2 %
EST. AVERAGE GLUCOSE BLD GHB EST-MCNC: 120 MG/DL (ref 68–126)
FASTING PATIENT LIPID ANSWER: NO
FASTING STATUS PATIENT QL REPORTED: NO
GLOBULIN PLAS-MCNC: 3.7 G/DL (ref 2.8–4.4)
GLUCOSE BLD-MCNC: 88 MG/DL (ref 70–99)
HBA1C MFR BLD: 5.8 % (ref ?–5.7)
HCT VFR BLD AUTO: 36.9 %
HDLC SERPL-MCNC: 85 MG/DL (ref 40–59)
HGB BLD-MCNC: 12.4 G/DL
IMM GRANULOCYTES # BLD AUTO: 0.01 X10(3) UL (ref 0–1)
IMM GRANULOCYTES NFR BLD: 0.2 %
LDLC SERPL CALC-MCNC: 102 MG/DL (ref ?–100)
LYMPHOCYTES # BLD AUTO: 1.57 X10(3) UL (ref 1–4)
LYMPHOCYTES NFR BLD AUTO: 37 %
MCH RBC QN AUTO: 32 PG (ref 26–34)
MCHC RBC AUTO-ENTMCNC: 33.6 G/DL (ref 31–37)
MCV RBC AUTO: 95.3 FL
MONOCYTES # BLD AUTO: 0.39 X10(3) UL (ref 0.1–1)
MONOCYTES NFR BLD AUTO: 9.2 %
NEUTROPHILS # BLD AUTO: 2.23 X10 (3) UL (ref 1.5–7.7)
NEUTROPHILS # BLD AUTO: 2.23 X10(3) UL (ref 1.5–7.7)
NEUTROPHILS NFR BLD AUTO: 52.7 %
NONHDLC SERPL-MCNC: 123 MG/DL (ref ?–130)
OSMOLALITY SERPL CALC.SUM OF ELEC: 294 MOSM/KG (ref 275–295)
PLATELET # BLD AUTO: 269 10(3)UL (ref 150–450)
POTASSIUM SERPL-SCNC: 3.8 MMOL/L (ref 3.5–5.1)
PROT SERPL-MCNC: 7.2 G/DL (ref 6.4–8.2)
RBC # BLD AUTO: 3.87 X10(6)UL
SODIUM SERPL-SCNC: 142 MMOL/L (ref 136–145)
TRIGL SERPL-MCNC: 123 MG/DL (ref 30–149)
VIT D+METAB SERPL-MCNC: 16.4 NG/ML (ref 30–100)
VLDLC SERPL CALC-MCNC: 20 MG/DL (ref 0–30)
WBC # BLD AUTO: 4.2 X10(3) UL (ref 4–11)

## 2024-02-14 PROCEDURE — 83036 HEMOGLOBIN GLYCOSYLATED A1C: CPT

## 2024-02-14 PROCEDURE — 85025 COMPLETE CBC W/AUTO DIFF WBC: CPT

## 2024-02-14 PROCEDURE — 36415 COLL VENOUS BLD VENIPUNCTURE: CPT

## 2024-02-14 PROCEDURE — 82306 VITAMIN D 25 HYDROXY: CPT

## 2024-02-14 PROCEDURE — 80061 LIPID PANEL: CPT

## 2024-02-14 PROCEDURE — 80053 COMPREHEN METABOLIC PANEL: CPT

## 2024-02-14 NOTE — PATIENT INSTRUCTIONS
Zepbound is the version of Mounjaro approved for weight loss. You can check with your insurance if that is covered    [email or web address]     (All of the medicines for weight loss require a BMI of 27.0 or above)      Blood work     Mammogram and DEXA - schedule for March by calling central scheduling at [phone]

## 2024-02-14 NOTE — PROGRESS NOTES
Subjective:   Calvin Parikh is a 68 year old female who presents for a MA (Medicare Advantage) Supervisit (Once per calendar year) and scheduled follow up of multiple significant but stable problems.     She is having side effects with alendronate and doesn't quite feel right. \"I don't feel dizzy but feels out of body\"  She has not taken the medicine for 3 weeks     She has not been taking vit D recently     She last ate lunch at noon     History/Other:   Fall Risk Assessment:   She has been screened for Falls and is low risk.      Cognitive Assessment:   She had a completely normal cognitive assessment - see flowsheet entries       Functional Ability/Status:   Calvin Parikh has a completely normal functional assessment. See flowsheet for details.      Depression Screening (PHQ-2/PHQ-9): PHQ-2 SCORE: 0  , done 2/14/2024   Last [place] Suicide Screening on 2/14/2024 was No Risk.       Advanced Directives:   She does NOT have a Living Will. [Do you have a living will?: No]  She does NOT have a Power of  for Health Care. [Do you have a healthcare power of ?: No]  Not discussed      Patient Active Problem List   Diagnosis    Pre-diabetes    Lichen sclerosus    Hypercholesterolemia    Age-related osteoporosis without current pathological fracture    S/P bilateral inguinal hernia repair    Hypovitaminosis D     Allergies:  She is allergic to hydrocodone and sulfa drugs cross reactors.    Current Medications:  Outpatient Medications Marked as Taking for the 2/14/24 encounter (Office Visit) with Maria Del Rosario Razo MD   Medication Sig    clindamycin 1 % External Lotion     Multiple Vitamins-Minerals (MULTI-VITAMIN/MINERALS) Oral Tab Take 1 tablet by mouth daily.    Cyanocobalamin (VITAMIN B-12) 5000 MCG Oral Tablet Dispersible Take by mouth daily as needed.    DESOXIMETASONE 0.25 % External Cream APPLY 1 GRAM TOPICALLY TO THE AFFECTED AREA TWO TIMES DAILY. (Patient taking differently: as needed. Apply 1 gram  topically to the affected area two times daily.)    acetaminophen 500 MG Oral Tab Take 1 tablet (500 mg total) by mouth every 6 (six) hours as needed for Pain.       Medical History:  She  has a past medical history of Acute shoulder pain, Blood in the stool, Calculus of kidney, Cataract, Chronic insomnia, Gout, Herniated disc, High cholesterol, Inguinal lymphadenopathy, Left inguinal pain, Neck pain (2021), Night sweats, Obesity (BMI 30-39.9), Pneumonia due to organism (), Prediabetes, Trapezius muscle spasm, Urinary urgency, and Visual impairment.  Surgical History:  She  has a past surgical history that includes excis lumbar disk,one level (2015); Fracture surgery (Right, 2004); ; colonoscopy; and spine surgery procedure unlisted ().   Family History:  Her family history includes Diabetes in her mother; Heart Attack in her mother.  Social History:  She  reports that she quit smoking about 39 years ago. Her smoking use included cigarettes. She has a 12 pack-year smoking history. She has never used smokeless tobacco. She reports current alcohol use. She reports that she does not use drugs.    Tobacco:  She smoked tobacco in the past but quit greater than 12 months ago.  Social History    Tobacco Use      Smoking status: Former        Packs/day: 2.00        Years: 6.00        Additional pack years: 0.00        Total pack years: 12.00        Types: Cigarettes        Quit date: 1985        Years since quittin.1      Smokeless tobacco: Never       CAGE Alcohol Screen:   CAGE screening score of 0 on 2024, showing low risk of alcohol abuse.      Patient Care Team:  Maria Del Rosario Razo MD as PCP - General (Internal Medicine)  Miguel Spencer MD as Consulting Physician (PULMONARY DISEASES)  Donn Toro MD as Consulting Physician (OBSTETRICS & GYNECOLOGY)  Braxton Man DPM as Consulting Physician (PODIATRIST)  Jaylan Ledezma DO (GASTROENTEROLOGY)  Toya Sawyer MD  (OBSTETRICS & GYNECOLOGY)  Rachana Crowley PA-C as Physician Assistant (NEUROLOGY)  Nathan Esposito MD as Consulting Physician (NEUROSURGERY)  Dillon Navarrete, PT as Physical Therapist  Jorje Loco PA as Consulting Physician (Physician Assistant)  Moisés Pappas, KEERTHI as Physical Therapist    Review of Systems   Constitutional:  Negative for fever.   HENT:  Negative for congestion.    Eyes:  Negative for visual disturbance.   Respiratory:  Negative for shortness of breath.    Cardiovascular:  Negative for chest pain.   Gastrointestinal:  Negative for constipation.   Genitourinary:  Negative for dysuria.   Neurological: Negative.    Hematological: Negative.    Psychiatric/Behavioral: Negative.           Objective:   Physical Exam  Vitals reviewed.   Constitutional:       General: She is not in acute distress.     Appearance: She is well-developed.   HENT:      Head: Normocephalic and atraumatic.   Eyes:      Conjunctiva/sclera: Conjunctivae normal.   Cardiovascular:      Rate and Rhythm: Normal rate and regular rhythm.      Heart sounds: Normal heart sounds.   Pulmonary:      Effort: Pulmonary effort is normal.      Breath sounds: Normal breath sounds.   Abdominal:      Palpations: Abdomen is soft.      Tenderness: There is no abdominal tenderness.   Musculoskeletal:      Cervical back: Neck supple.      Right lower leg: No edema.      Left lower leg: No edema.   Skin:     General: Skin is warm and dry.   Neurological:      General: No focal deficit present.      Mental Status: She is alert.   Psychiatric:         Mood and Affect: Mood normal.           /62   Pulse 94   Temp 98.1 °F (36.7 °C) (Temporal)   Resp 16   Ht 5' 5\" (1.651 m)   Wt 159 lb 9.6 oz (72.4 kg)   SpO2 98%   BMI 26.56 kg/m²  Estimated body mass index is 26.56 kg/m² as calculated from the following:    Height as of this encounter: 5' 5\" (1.651 m).    Weight as of this encounter: 159 lb 9.6 oz (72.4 kg).    Medicare Hearing  Assessment:   Hearing Screening    Time taken: 2/14/2024  2:06 PM  Entry User: Sarah Joaquin  Screening Method: Finger Rub  Finger Rub Result: Pass                     Assessment & Plan:   Calvin Parikh is a 68 year old female who presents for a Medicare Assessment.     1. Wellness examination (Primary)  -due for mammo 3/2024  -colonoscopy due 8/2025  -received pna, flu and shingrix vaccines   2. Screening mammogram for breast cancer  -     [facility] ARACELIS 2D+3D SCREENING BILAT (CPT=77067/76645); Future; Expected date: 03/20/2024  3. Hypercholesterolemia -chronic, stable. Check labs   -     Comp Metabolic Panel (14); Future; Expected date: 02/14/2024  -     Lipid Panel; Future; Expected date: 02/14/2024  4. Pre-diabetes -chronic, stable. cont low carb diet; check a1c  -     Hemoglobin A1C; Future; Expected date: 02/14/2024  5. Hypovitaminosis D -stable. Check labs   -     Vitamin D; Future; Expected date: 02/14/2024  6. Age-related osteoporosis without current pathological fracture -chronic, stable. She discontinued alendronate due to side effects. We discussed alternative medication such as boniva. She prefers to not take any med for now and re-check DEXA later this year.   7. Lichen sclerosus - stable; per gynecology  8. S/P bilateral inguinal hernia repair - stable     The patient indicates understanding of these issues and agrees to the plan.  Reinforced healthy diet, lifestyle, and exercise.      No follow-ups on file.     Maria Del Rosario Razo MD, 2/14/2024     Supplementary Documentation:   General Health:  In the past six months, have you lost more than 10 pounds without trying?: 2 - No  Has your appetite been poor?: No  Type of Diet: Balanced  How does the patient maintain a good energy level?: Appropriate Exercise  How would you describe your daily physical activity?: Moderate  How would you describe your current health state?: Good  How do you maintain positive mental well-being?: Social  Interaction;Puzzles;Games;Visiting Friends;Visiting Family  On a scale of 0 to 10, with 0 being no pain and 10 being severe pain, what is your pain level?: 0 - (None)  In the past six months, have you experienced urine leakage?: 0-No  At any time do you feel concerned for the safety/well-being of yourself and/or your children, in your home or elsewhere?: No  Have you had any immunizations at another office such as Influenza, Hepatitis B, Tetanus, or Pneumococcal?: Yes       Calvin Parikh's SCREENING SCHEDULE   Tests on this list are recommended by your physician but may not be covered, or covered at this frequency, by your insurer.   Please check with your insurance carrier before scheduling to verify coverage.   PREVENTATIVE SERVICES FREQUENCY &  COVERAGE DETAILS LAST COMPLETION DATE   Diabetes Screening    Fasting Blood Sugar /  Glucose    One screening every 12 months if never tested or if previously tested but not diagnosed with pre-diabetes   One screening every 6 months if diagnosed with pre-diabetes Lab Results   Component Value Date    GLU 98 05/13/2023        Cardiovascular Disease Screening    Lipid Panel  Cholesterol  Lipoprotein (HDL)  Triglycerides Covered every 5 years for all Medicare beneficiaries without apparent signs or symptoms of cardiovascular disease Lab Results   Component Value Date    CHOLEST 217 (H) 03/09/2023    HDL 69 (H) 03/09/2023     (H) 03/09/2023    TRIG 130 03/09/2023         Electrocardiogram (EKG)   Covered if needed at Welcome to Medicare, and non-screening if indicated for medical reasons 03/22/2021      Ultrasound Screening for Abdominal Aortic Aneurysm (AAA) Covered once in a lifetime for one of the following risk factors    Men who are 65-75 years old and have ever smoked    Anyone with a family history -     Colorectal Cancer Screening  Covered for ages 50-85; only need ONE of the following:    Colonoscopy   Covered every 10 years    Covered every 2 years if  patient is at high risk or previous colonoscopy was abnormal 08/28/2015    Health Maintenance   Topic Date Due    Colorectal Cancer Screening  08/28/2025       Flexible Sigmoidoscopy   Covered every 4 years -    Fecal Occult Blood Test Covered annually -   Bone Density Screening    Bone density screening    Covered every 2 years after age 65 if diagnosed with risk of osteoporosis or estrogen deficiency.    Covered yearly for long-term glucocorticoid medication use (Steroids) Last Dexa Scan:    XR DEXA BONE DENSITOMETRY (CPT=77080) 11/09/2021      No recommendations at this time   Pap and Pelvic    Pap   Covered every 2 years for women at normal risk; Annually if at high risk 12/20/2019  No recommendations at this time    Chlamydia Annually if high risk -  No recommendations at this time   Screening Mammogram    Mammogram     Recommend annually for all female patients aged 40 and older    One baseline mammogram covered for patients aged 35-39 03/20/2023    Health Maintenance   Topic Date Due    Mammogram  03/20/2024       Immunizations    Influenza Covered once per flu season  Please get every year 10/19/2023  No recommendations at this time    Pneumococcal Each vaccine (Fylpujm54 & Oczjstimu85) covered once after 65 Prevnar 13: 08/27/2020    [id number]: 08/03/2021     No recommendations at this time    Hepatitis B One screening covered for patients with certain risk factors   -  No recommendations at this time    Tetanus Toxoid Not covered by Medicare Part B unless medically necessary (cut with metal); may be covered with your pharmacy prescription benefits -    Tetanus, Diptheria and Pertusis TD and TDaP Not covered by Medicare Part B -  No recommendations at this time    Zoster Not covered by Medicare Part B; may be covered with your pharmacy  prescription benefits -  No recommendations at this time           ,

## 2024-02-16 RX ORDER — ERGOCALCIFEROL 1.25 MG/1
50000 CAPSULE ORAL WEEKLY
Qty: 12 CAPSULE | Refills: 0 | Status: SHIPPED | OUTPATIENT
Start: 2024-02-16 | End: 2024-05-16

## 2024-02-27 NOTE — PATIENT INSTRUCTIONS
Flu shot recommended  RSV vaccine recommended   COVID booster in another month or so
[DrMax ___] : Dr. IRVING

## 2024-03-20 ENCOUNTER — HOSPITAL ENCOUNTER (OUTPATIENT)
Dept: BONE DENSITY | Age: 69
Discharge: HOME | End: 2024-03-20
Attending: INTERNAL MEDICINE
Payer: MEDICARE

## 2024-03-20 ENCOUNTER — HOSPITAL ENCOUNTER (OUTPATIENT)
Dept: MAMMOGRAPHY | Age: 69
Discharge: HOME | End: 2024-03-20
Attending: INTERNAL MEDICINE
Payer: MEDICARE

## 2024-03-20 DIAGNOSIS — Z12.31 SCREENING MAMMOGRAM FOR BREAST CANCER: ICD-10-CM

## 2024-03-20 DIAGNOSIS — M81.0 AGE-RELATED OSTEOPOROSIS WITHOUT CURRENT PATHOLOGICAL FRACTURE: ICD-10-CM

## 2024-03-20 PROCEDURE — 77063 BREAST TOMOSYNTHESIS BI: CPT | Performed by: INTERNAL MEDICINE

## 2024-03-20 PROCEDURE — 77080 DXA BONE DENSITY AXIAL: CPT | Performed by: INTERNAL MEDICINE

## 2024-03-20 PROCEDURE — 77067 SCR MAMMO BI INCL CAD: CPT | Performed by: INTERNAL MEDICINE

## 2024-03-21 ENCOUNTER — TELEPHONE (OUTPATIENT)
Dept: INTERNAL MEDICINE CLINIC | Facility: CLINIC | Age: 69
End: 2024-03-21

## 2024-03-21 DIAGNOSIS — L90.0 LICHEN SCLEROSUS: Primary | ICD-10-CM

## 2024-03-21 NOTE — TELEPHONE ENCOUNTER
Pt called in regarding clarity for lab results of DEXA bone scan.     - pt would like clarity on which medication LS means. Does that mean she doesn't need to go back on alendronate?  Or pt to stop ergocalciferol 1.25 MG (06176 UT) Oral Cap?    -pt is also concerned about the \"worsening at femoral neck\".  Pt is concerned if that is worsening then what will scan look like in 2 years? She feels something should be done now or checked sooner. Please clarify/advise.    Thanks!     3/20--  Your bone density had a lot of improvement at the lumbar spine, is about the same at the total hip, and is a little worse at the femoral neck reading. Ok to continue off of medication and let's re-check bone density test in 2 years.

## 2024-03-22 NOTE — TELEPHONE ENCOUNTER
She has had large improvemtn of 6% at the lumbar spine. The total hip is 2.5% worse. Overall she has had net improvement in bone density.     There is risk of atypical hip fracture if taking alendronate longer than 5 years. Therefore I recommend stopping alendronate and re-checking the DEXA in 1-2 years. Insurance does not cover checking the DEXA sooner    Ok to continue vitamin D.     If she wants to be more aggressive in treating the osteoporosis, there is a medication called Prolia that is in a different class of medicines than alendronate. It is a step up in therapy. It is an injection every 6 months but once you start it, you are committing to taking it for life as stopping it causes a worsening in bone density. She can schedule an appointment to discuss the medication further

## 2024-03-22 NOTE — TELEPHONE ENCOUNTER
Spoke to pt, shared LS's additional message with her as stated below.     Pt said she will continue the Rx Vitamin D and 1 week after last dose, she will start OTC Vitamin D 2000 units per day.   Pt said at this time, she does not want to start Prolia.     Pt will focus on plenty of calcium, vitamin D, and weight-bearing exercise as tolerated.     At the end of call, pt said she needs a new referral for OB/Gyne, issue:      L900 - Lichen sclerosus et atrophicus     Pt did not like previous OB/Gyne, requests new provider.   Ok to call pt when referral is approved.    RN checked with LS, pt's specific dx does not require specialty referral.     Referral placed per protocol.    Auto-authorized ([employer]).     [facility] sent to pt with contact information.

## 2024-04-04 ENCOUNTER — HOSPITAL ENCOUNTER (EMERGENCY)
Facility: HOSPITAL | Age: 69
Discharge: HOME | End: 2024-04-04
Attending: EMERGENCY MEDICINE
Payer: MEDICARE

## 2024-04-04 VITALS
DIASTOLIC BLOOD PRESSURE: 73 MMHG | SYSTOLIC BLOOD PRESSURE: 128 MMHG | HEART RATE: 94 BPM | RESPIRATION RATE: 20 BRPM | BODY MASS INDEX: 25.99 KG/M2 | OXYGEN SATURATION: 99 % | TEMPERATURE: 98 F | HEIGHT: 65 IN | WEIGHT: 156 LBS

## 2024-04-04 DIAGNOSIS — N39.0 URINARY TRACT INFECTION WITHOUT HEMATURIA, SITE UNSPECIFIED: ICD-10-CM

## 2024-04-04 DIAGNOSIS — L90.0 LICHEN SCLEROSUS: Primary | ICD-10-CM

## 2024-04-04 LAB
BILIRUB UR QL STRIP.AUTO: NEGATIVE
CLARITY UR REFRACT.AUTO: CLEAR
COLOR UR AUTO: YELLOW
GLUCOSE UR STRIP.AUTO-MCNC: NORMAL MG/DL
KETONES UR STRIP.AUTO-MCNC: NEGATIVE MG/DL
LEUKOCYTE ESTERASE UR QL STRIP.AUTO: 250
PH UR STRIP.AUTO: 5.5 [PH] (ref 5–8)
PROT UR STRIP.AUTO-MCNC: NEGATIVE MG/DL
RBC UR QL AUTO: NEGATIVE
SP GR UR STRIP.AUTO: 1.03 (ref 1–1.03)
UROBILINOGEN UR STRIP.AUTO-MCNC: NORMAL MG/DL
WBC #/AREA URNS AUTO: >50 /HPF

## 2024-04-04 PROCEDURE — 81001 URINALYSIS AUTO W/SCOPE: CPT | Performed by: EMERGENCY MEDICINE

## 2024-04-04 PROCEDURE — 87088 URINE BACTERIA CULTURE: CPT | Performed by: EMERGENCY MEDICINE

## 2024-04-04 PROCEDURE — 87086 URINE CULTURE/COLONY COUNT: CPT | Performed by: EMERGENCY MEDICINE

## 2024-04-04 PROCEDURE — 99284 EMERGENCY DEPT VISIT MOD MDM: CPT

## 2024-04-04 PROCEDURE — 87186 SC STD MICRODIL/AGAR DIL: CPT | Performed by: EMERGENCY MEDICINE

## 2024-04-04 PROCEDURE — 99283 EMERGENCY DEPT VISIT LOW MDM: CPT

## 2024-04-04 RX ORDER — TRIAMCINOLONE ACETONIDE 5 MG/G
1 CREAM TOPICAL 2 TIMES DAILY
Qty: 60 G | Refills: 1 | Status: SHIPPED | OUTPATIENT
Start: 2024-04-04 | End: 2024-04-08

## 2024-04-04 RX ORDER — TRIAMCINOLONE ACETONIDE 5 MG/G
1 CREAM TOPICAL 2 TIMES DAILY
Qty: 60 G | Refills: 1 | Status: SHIPPED | OUTPATIENT
Start: 2024-04-04 | End: 2024-04-04

## 2024-04-04 RX ORDER — CEPHALEXIN 500 MG/1
500 CAPSULE ORAL 3 TIMES DAILY
Qty: 21 CAPSULE | Refills: 0 | Status: SHIPPED | OUTPATIENT
Start: 2024-04-04 | End: 2024-04-11

## 2024-04-04 RX ORDER — CEPHALEXIN 500 MG/1
500 CAPSULE ORAL 3 TIMES DAILY
Qty: 21 CAPSULE | Refills: 0 | Status: SHIPPED | OUTPATIENT
Start: 2024-04-04 | End: 2024-04-04

## 2024-04-04 RX ORDER — IBUPROFEN 600 MG/1
600 TABLET ORAL ONCE
Status: COMPLETED | OUTPATIENT
Start: 2024-04-04 | End: 2024-04-04

## 2024-04-05 NOTE — ED INITIAL ASSESSMENT (HPI)
Pt c/o vaginal itching for 3 weeks, \"have a hard time sitting and urinating, it's raw, red and swollen.\" Pt denies discharge to area. States she was dx Lichen Sclerosus in 2022, \"having a flare up.\" Pt denies fever. Pt scheduled to see a new OB/Gyne on 4/19

## 2024-04-05 NOTE — ED PROVIDER NOTES
Patient Seen in: [facility] Emergency Department      History     Chief Complaint   Patient presents with    Eval-G     Stated Complaint: VAG IRRITATION, WORSE WITH URINATION    Subjective:   HPI    A 68-year-old female history of lichen sclerosis presents emergency room for evaluation of vaginal irritation.  Patient states she has a steroid cream at home which she was using a couple months ago but over the last week she has had recurrent vaginal itching.  She does have some dysuria.  She denies any abdominal pain.  No other complaints.  Patient is not sexually active.    Objective:   No pertinent past medical history.            No pertinent past surgical history.              No pertinent social history.            Review of Systems    Positive for stated complaint: VAG IRRITATION, WORSE WITH URINATION  Other systems are as noted in HPI.  Constitutional and vital signs reviewed.      All other systems reviewed and negative except as noted above.    Physical Exam     ED Triage Vitals [04/04/24 2007]   /73   Pulse 94   Resp 20   Temp 98.1 °F (36.7 °C)   Temp src Temporal   SpO2 99 %   O2 Device None (Room air)       Current:/73   Pulse 94   Temp 98.1 °F (36.7 °C) (Temporal)   Resp 20   Ht 165.1 cm (5' 5\")   Wt 70.8 kg   SpO2 99%   BMI 25.96 kg/m²         Physical Exam    Constitutional: Well-appearing in no acute distress  Abdominal: Soft and nontender  Genitourinary: Patient has some white plaque-like raised lesions on her labia.    ED Course     Labs Reviewed   URINALYSIS WITH CULTURE REFLEX - Abnormal; Notable for the following components:       Result Value    Nitrite Urine 1+ (*)     Leukocyte Esterase Urine 250 (*)     WBC Urine >50 (*)     RBC Urine 3-5 (*)     Bacteria Urine 1+ (*)     Squamous Epi. Cells Few (*)     All other components within normal limits   URINE CULTURE, ROUTINE             Medications   ibuprofen (Motrin) tab 600 mg (600 mg Oral Given 4/4/24 2011)               MDM      Patient is a 68-year-old female presents to emergency room for evaluation of vaginal irritation.  Differential includes lichen sclerosis, urinary tract infection.  Patient does have lichen sclerosus lesions noted.  Also with concomitant urinary tract infection.  Urine culture sent.  Patient be given triamcinolone cream along with Keflex.  She was advised to follow-up with gynecology.        Patient was screened and evaluated during this visit.   As a treating physician attending to the patient, I determined, within reasonable clinical confidence and prior to discharge, that an emergency medical condition was not or was no longer present.  There was no indication for further evaluation, treatment or admission on an emergency basis.  Comprehensive verbal and written discharge and follow-up instructions were provided to help prevent relapse or worsening.  Patient was instructed to follow-up with her primary care provider for further evaluation and treatment, but to return immediately to the ER for worsening, concerning, new, changing or persisting symptoms.  I discussed the case with the patient and they had no questions, complaints, or concerns.  Patient felt comfortable going home.                               Medical Decision Making      Disposition and Plan     Clinical Impression:  1. Lichen sclerosus    2. Urinary tract infection without hematuria, site unspecified         Disposition:  Discharge  4/4/2024 11:19 pm    Follow-up:  Maria Del Rosario Razo MD  [address] [address] [phone]    Follow up  As needed    Elizabeth Leslie MD  [address] [address] [phone]    Follow up  As needed          Medications Prescribed:  Discharge Medication List as of 4/4/2024 11:33 PM

## 2024-04-07 NOTE — PROGRESS NOTES
ED Culture Callback Results Review    Pharmacist reviewed culture results from ED visit .    Final urine culture positive for E. coli. Patient was prescribed Cephalexin (Keflex) on discharge. Current therapy is appropriate based on reported susceptibilities. No further intervention required at this time.      Tyrell Saleh PharmD  Emergency Medicine Pharmacist Specialist  04/07/24; 12:47 PM

## 2024-04-08 ENCOUNTER — OFFICE VISIT (OUTPATIENT)
Dept: OBGYN CLINIC | Facility: CLINIC | Age: 69
End: 2024-04-08
Payer: MEDICARE

## 2024-04-08 VITALS
DIASTOLIC BLOOD PRESSURE: 72 MMHG | SYSTOLIC BLOOD PRESSURE: 110 MMHG | HEART RATE: 83 BPM | WEIGHT: 164 LBS | BODY MASS INDEX: 27 KG/M2

## 2024-04-08 DIAGNOSIS — L29.2 VULVAR PRURITUS: Primary | ICD-10-CM

## 2024-04-08 DIAGNOSIS — L90.0 LICHEN SCLEROSUS: ICD-10-CM

## 2024-04-08 RX ORDER — CLOBETASOL PROPIONATE 0.5 MG/G
OINTMENT TOPICAL
Qty: 60 G | Refills: 0 | Status: SHIPPED | OUTPATIENT
Start: 2024-04-08

## 2024-04-08 RX ORDER — ESTRADIOL 0.1 MG/G
CREAM VAGINAL
Qty: 42.5 G | Refills: 3 | Status: SHIPPED | OUTPATIENT
Start: 2024-04-08

## 2024-04-08 RX ORDER — ESTRADIOL 0.1 MG/G
CREAM VAGINAL
Qty: 42.5 G | Refills: 3 | Status: SHIPPED | OUTPATIENT
Start: 2024-04-08 | End: 2024-04-08

## 2024-04-08 NOTE — PROGRESS NOTES
GYN PROBLEM VISIT    Subjective:   This is a 68 year old  presenting for GYN visit.     In , she saw Dr. Sawyer with vulvar itching. 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 She has gone several months without using her clobetasol. On a cruise, her vulvar and vaginal itching suddenly worsened. She has been having excruciating itching. She went to the ED and was given triamcinolone. The vulvar itching is much improved, but the vaginal itching continues.     Pathology for   Sections show epidermal hyperplasia with hyperkeratosis and thickened granular layer.  There is a superficial dermal lichenoid inflammatory infiltrate with associated interface activity, characterized by basilar keratinocyte dyskeratosis.     The differential diagnosis includes lichen planus and early evolving lichen sclerosus.  There is no evidence of dysplasia or malignancy.      Review of Systems   Constitutional: Negative.    HENT: Negative.    Respiratory: Negative.    Gastrointestinal: Negative.    Endocrine: Negative.    Genitourinary: Negative.    Musculoskeletal: Negative.    Skin: Negative.    Allergic/Immunologic: Negative.    Neurological: Negative.      Past Medical History:   Diagnosis Date    Acute shoulder pain     Blood in the stool     Calculus of kidney     Cataract     Chronic insomnia     Gout     Herniated disc     High cholesterol     Inguinal lymphadenopathy     Left inguinal pain     Neck pain 2021    Night sweats     Obesity (BMI 30-39.9)     Pneumonia due to organism     Prediabetes     Trapezius muscle spasm     Urinary urgency     Visual impairment     reading       Past Surgical History:   Procedure Laterality Date          69,70,71    COLONOSCOPY      EXCIS LUMBAR DISK,ONE LEVEL  2015    FRACTURE SURGERY Right 2004    ankle    SPINE SURGERY PROCEDURE UNLISTED      herniated disc       Allergies   Allergen Reactions    Hydrocodone NAUSEA AND VOMITING     Doesn't  like. It took control over her.    Sulfa Drugs Cross Reactors UNKNOWN        cephalexin 500 MG Oral Cap Take 1 capsule (500 mg total) by mouth 3 (three) times daily for 7 days. 21 capsule 0    triamcinolone 0.5 % External Cream Apply 1 Application topically 2 (two) times daily. 60 g 1    ergocalciferol 1.25 MG (68179 UT) Oral Cap Take 1 capsule (50,000 Units total) by mouth once a week. 12 capsule 0    clindamycin 1 % External Lotion       Multiple Vitamins-Minerals (MULTI-VITAMIN/MINERALS) Oral Tab Take 1 tablet by mouth daily.      Cyanocobalamin (VITAMIN B-12) 5000 MCG Oral Tablet Dispersible Take by mouth daily as needed.      acetaminophen 500 MG Oral Tab Take 1 tablet (500 mg total) by mouth every 6 (six) hours as needed for Pain.           Objective:    Physical Exam     Vitals:    24 1407   BP: 110/72   Pulse: 83        Constitutional: She is oriented to person, place, and time. She appears well-developed and well-nourished.   Genitourinary: Significant hypopigmentation of the vulvar and perirectal areas, white patches along inner labia minora, pt could not tolerate speculum exam.     Assessment/Plan:  This is a 68 year old  presenting with vulvar and vaginal pruritus.     -clobetasol ointment nightly for 4 weeks  -vaginal estrogen nightly for 4 weeks  -return to clinic in 1 month for more thorough exam after symptoms improve w/ the above regimen  -consider bx again if symptoms do not improve      Tai Downs MD

## 2024-04-09 ENCOUNTER — PATIENT OUTREACH (OUTPATIENT)
Dept: CASE MANAGEMENT | Age: 69
End: 2024-04-09

## 2024-04-09 NOTE — PROGRESS NOTES
1st attempt ER f/up apt request  PCP -decline, pt will f/up w/ OBGYN   OBGYN -existing apt (4/8)  Closing encounter

## 2024-05-15 ENCOUNTER — TELEPHONE (OUTPATIENT)
Dept: OBGYN CLINIC | Facility: CLINIC | Age: 69
End: 2024-05-15

## 2024-05-15 ENCOUNTER — OFFICE VISIT (OUTPATIENT)
Dept: OBGYN CLINIC | Facility: CLINIC | Age: 69
End: 2024-05-15

## 2024-05-15 VITALS
BODY MASS INDEX: 27 KG/M2 | WEIGHT: 163.81 LBS | DIASTOLIC BLOOD PRESSURE: 70 MMHG | SYSTOLIC BLOOD PRESSURE: 118 MMHG | HEART RATE: 85 BPM

## 2024-05-15 DIAGNOSIS — L90.0 LICHEN SCLEROSUS: Primary | ICD-10-CM

## 2024-05-15 DIAGNOSIS — N95.2 VAGINAL ATROPHY: ICD-10-CM

## 2024-05-15 PROCEDURE — 1159F MED LIST DOCD IN RCRD: CPT | Performed by: STUDENT IN AN ORGANIZED HEALTH CARE EDUCATION/TRAINING PROGRAM

## 2024-05-15 PROCEDURE — 1160F RVW MEDS BY RX/DR IN RCRD: CPT | Performed by: STUDENT IN AN ORGANIZED HEALTH CARE EDUCATION/TRAINING PROGRAM

## 2024-05-15 PROCEDURE — 3074F SYST BP LT 130 MM HG: CPT | Performed by: STUDENT IN AN ORGANIZED HEALTH CARE EDUCATION/TRAINING PROGRAM

## 2024-05-15 PROCEDURE — 3078F DIAST BP <80 MM HG: CPT | Performed by: STUDENT IN AN ORGANIZED HEALTH CARE EDUCATION/TRAINING PROGRAM

## 2024-05-15 PROCEDURE — 99212 OFFICE O/P EST SF 10 MIN: CPT | Performed by: STUDENT IN AN ORGANIZED HEALTH CARE EDUCATION/TRAINING PROGRAM

## 2024-05-15 RX ORDER — ESTRADIOL 0.1 MG/G
CREAM VAGINAL
Qty: 42.5 G | Refills: 3 | Status: SHIPPED | OUTPATIENT
Start: 2024-05-15

## 2024-05-15 NOTE — TELEPHONE ENCOUNTER
Tai tipton MD  P [facility] Nurse  Hello,    This patient says that her estradiol cream cannot be refilled until June and she is going to run out soon. I am not sure if she needs a PA or something?    Thank you!    Tai Downs MD

## 2024-05-15 NOTE — PROGRESS NOTES
GYN PROBLEM VISIT    Subjective:   This is a 68 year old  presenting for GYN visit.     Last saw her 22 when she had a flare of her vulvar itching. She likely has lichen sclerosis per biopsy. She used the clobetasol and vaginal estrogen for 1 month and experienced significant relief. Itching was mild and returned on Friday.       Review of Systems   Constitutional: Negative.    HENT: Negative.    Respiratory: Negative.    Gastrointestinal: Negative.    Endocrine: Negative.    Genitourinary: as above  Musculoskeletal: Negative.    Skin: Negative.    Allergic/Immunologic: Negative.    Neurological: Negative.      Past Medical History:    Acute shoulder pain    Blood in the stool    Calculus of kidney    Cataract    Chronic insomnia    Gout    Herniated disc    High cholesterol    Inguinal lymphadenopathy    Left inguinal pain    Neck pain    Night sweats    Obesity (BMI 30-39.9)    Pneumonia due to organism    Prediabetes    Trapezius muscle spasm    Urinary urgency    Visual impairment    reading       Past Surgical History:   Procedure Laterality Date          69,70,71    Colonoscopy      Excis lumbar disk,one level  2015    Fracture surgery Right 2004    ankle    Spine surgery procedure unlisted      herniated disc       Allergies   Allergen Reactions    Hydrocodone NAUSEA AND VOMITING     Doesn't like. It took control over her.    Sulfa Drugs Cross Reactors UNKNOWN        clobetasol 0.05 % External Ointment Apply nightly for 4 week then 2 nights a week 60 g 0    estradiol 0.1 MG/GM Vaginal Cream Use 1g nightly for 1 months then 2 nights a week 42.5 g 3    ergocalciferol 1.25 MG (77382 UT) Oral Cap Take 1 capsule (50,000 Units total) by mouth once a week. 12 capsule 0    Multiple Vitamins-Minerals (MULTI-VITAMIN/MINERALS) Oral Tab Take 1 tablet by mouth daily.      Cyanocobalamin (VITAMIN B-12) 5000 MCG Oral Tablet Dispersible Take by mouth daily as needed.            Objective:    Physical Exam     Vitals:    05/15/24 1153   BP: 118/70   Pulse: 85        Constitutional: She is oriented to person, place, and time. She appears well-developed and well-nourished.   Genitourinary: hypopigmentation around clitoris, labia majora/minora and rectum. No excoriations, no abnormal lesions seen. Vaginal mucosa visualized and appeared normal, but pt did still have discomfort with insertion of speculum. Limited view of cervix.    Assessment/Plan:  This is a 68 year old  presenting with lichen sclerosis and vaginal atrophy. Continue with clobetasol and estradiol 2 nights a week.    Tai Downs MD

## 2024-05-16 NOTE — TELEPHONE ENCOUNTER
Spoke to pharmacy.  Insurance will not cover a refill until 6/22/24 due to patient receiving a 90 day supply on 4/8/2024.  Patient has the option to use discount card to purchase additional supply for $67.79.  Patient notified of options.  Patient verbalized understanding.

## 2024-06-24 RX ORDER — ERGOCALCIFEROL 1.25 MG/1
50000 CAPSULE ORAL WEEKLY
Qty: 12 CAPSULE | Refills: 0 | OUTPATIENT
Start: 2024-06-24

## 2024-06-24 NOTE — TELEPHONE ENCOUNTER
Hi Ms. Parikh,     Your vitamin D is low. I sent a vitamin D prescription to your local pharmacy to take once a week for 12 weeks. When completed, start over the counter vitamin D 2000 units daily.     A1c is improved to 5.8, in pre-diabetes range.     Your cholesterol is at goal.     Other results look good.     Sincerely,  Dr. Razo

## 2024-10-18 ENCOUNTER — HOSPITAL ENCOUNTER (EMERGENCY)
Facility: HOSPITAL | Age: 69
Discharge: HOME | End: 2024-10-18
Attending: EMERGENCY MEDICINE
Payer: OTHER MISCELLANEOUS

## 2024-10-18 ENCOUNTER — APPOINTMENT (OUTPATIENT)
Dept: GENERAL RADIOLOGY | Facility: HOSPITAL | Age: 69
End: 2024-10-18
Payer: OTHER MISCELLANEOUS

## 2024-10-18 VITALS
BODY MASS INDEX: 27.83 KG/M2 | DIASTOLIC BLOOD PRESSURE: 87 MMHG | HEART RATE: 81 BPM | SYSTOLIC BLOOD PRESSURE: 126 MMHG | OXYGEN SATURATION: 96 % | TEMPERATURE: 97 F | RESPIRATION RATE: 18 BRPM | WEIGHT: 163 LBS | HEIGHT: 64 IN

## 2024-10-18 DIAGNOSIS — S60.052A CONTUSION OF LEFT LITTLE FINGER WITHOUT DAMAGE TO NAIL, INITIAL ENCOUNTER: Primary | ICD-10-CM

## 2024-10-18 PROCEDURE — 99283 EMERGENCY DEPT VISIT LOW MDM: CPT

## 2024-10-18 PROCEDURE — 73140 X-RAY EXAM OF FINGER(S): CPT | Performed by: EMERGENCY MEDICINE

## 2024-10-19 NOTE — ED INITIAL ASSESSMENT (HPI)
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     Patient works at [employer] on [address] [employer] [address] [employer].

## 2024-10-19 NOTE — ED PROVIDER NOTES
Patient Seen in: [facility] Emergency Department      History     Chief Complaint   Patient presents with    Arm or Hand Injury     Stated Complaint: hand injury at work    Subjective:   HPI      68-year-old female comes to the hospital complaint having difficulty with pain over the medial margin of her left hand including her finger and metacarpal region that earlier was swollen and bruised after her hand which was wearing a glove got stuck under the conveyor belt.  She had put ice on it and wrapped it and it is started and feels somewhat better at this time.    Objective:     Past Medical History:    Acute shoulder pain    Blood in the stool    Calculus of kidney    Cataract    Chronic insomnia    Gout    Herniated disc    High cholesterol    Inguinal lymphadenopathy    Left inguinal pain    Neck pain    Night sweats    Obesity (BMI 30-39.9)    Pneumonia due to organism    Prediabetes    Trapezius muscle spasm    Urinary urgency    Visual impairment    reading              Past Surgical History:   Procedure Laterality Date          69,70,71    Colonoscopy      Excis lumbar disk,one level  2015    Fracture surgery Right 2004    ankle    Spine surgery procedure unlisted      herniated disc                Social History     Socioeconomic History    Marital status: Single   Tobacco Use    Smoking status: Former     Current packs/day: 0.00     Average packs/day: 2.0 packs/day for 6.0 years (12.0 ttl pk-yrs)     Types: Cigarettes     Start date: 1979     Quit date: 1985     Years since quittin.8    Smokeless tobacco: Never   Vaping Use    Vaping status: Never Used   Substance and Sexual Activity    Alcohol use: Yes     Comment: occ     Drug use: No    Sexual activity: Not Currently   Other Topics Concern    Caffeine Concern Yes     Comment: 1 cup coffee (occ) 3 times per week     Exercise Yes    Seat Belt Yes                  Physical Exam     ED Triage Vitals [10/18/24 2128]    /87   Pulse 81   Resp 18   Temp 96.8 °F (36 °C)   Temp src Temporal   SpO2 96 %   O2 Device None (Room air)       Current Vitals:   Vital Signs  BP: 126/87  Pulse: 81  Resp: 18  Temp: 96.8 °F (36 °C)  Temp src: Temporal    Oxygen Therapy  SpO2: 96 %  O2 Device: None (Room air)        Physical Exam  Extremity tenderness noted over the distal fifth metacarpal as well as the fifth digit with only mild edema noted at this time.  Range of motion is.  She is otherwise neurovascular intact without deformity.    ED Course   Labs Reviewed - No data to display    ED Course as of 10/18/24 2228  ------------------------------------------------------------  Time: 10/18 2227  Comment: While here the patient had an x-ray done of the left fifth digit per my interpretation was negative for fracture.  Read the radiology report as well.       XR FINGER(S) (MIN 2 VIEWS), LEFT 5TH (CPT=73140)    Result Date: 10/18/2024  PROCEDURE:  XR FINGER(S) (MIN 2 VIEWS), LEFT 5TH (CPT=73140)  INDICATIONS:  hand injury at work  COMPARISON:  None.  TECHNIQUE:  Three views of the finger were obtained.  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 Patient states skin tears, pain and swelling along distal digit and along medial aspect of left hand.    FINDINGS:  No acute fractures or osseous lesions are identified.  Phalangeal relationships are within normal limits.  No scapholunate widening.  Radial carpal relationship is normal.  Osteoarthritic changes at the carpal 1st metacarpal junction.              CONCLUSION:  No acute fractures.   LOCATION:  [facility]   Dictated by (CST): Tomas Coker MD on 10/18/2024 at 10:03 PM     Finalized by (CST): Tomas Coker MD on 10/18/2024 at 10:04 PM             MDM      Differential diagnosis included fracture versus bruising but not limited such.  Patient's workup is consistent  with a bruise and at this time the patient we discharged home with further outpatient management care.    Patient was screened and evaluated during this visit.   As a treating physician attending to the patient, I determined, within reasonable clinical confidence and prior to discharge, that an emergency medical condition was not or was no longer present.  There was no indication for further evaluation, treatment or admission on an emergency basis.       The usual and customary discharge instuctions were discussed given the patient's ER course.  We discussed signs and symptoms that should prompt the patient's immediate return to the emergency department.   Reasonable over the counter and prescription treatment options and Physician follow up plan was discussed.       The patient is discharged in good condition.     This note was prepared using Dragon Medical voice recognition dictation software.  As a result errors may occur.  When identified to these areas have been corrected.  While every attempt is made to correct errors during dictation discrepancies may still exist.  Please contact if there are any errors.        Medical Decision Making      Disposition and Plan     Clinical Impression:  1. Contusion of left little finger without damage to nail, initial encounter         Disposition:  Discharge  10/18/2024 10:28 pm    Follow-up:  Maria Del Rosario Razo MD  [address] [place] [id number] [phone]    Schedule an appointment as soon as possible for a visit in 3 day(s)            Medications Prescribed:  Current Discharge Medication List              Supplementary Documentation:

## 2024-10-21 ENCOUNTER — PATIENT OUTREACH (OUTPATIENT)
Dept: CASE MANAGEMENT | Age: 69
End: 2024-10-21

## 2024-10-21 DIAGNOSIS — Z02.9 ENCOUNTERS FOR UNSPECIFIED ADMINISTRATIVE PURPOSE: Primary | ICD-10-CM

## 2024-10-21 PROCEDURE — 1111F DSCHRG MED/CURRENT MED MERGE: CPT

## 2024-10-22 ENCOUNTER — TELEPHONE (OUTPATIENT)
Dept: INTERNAL MEDICINE CLINIC | Facility: CLINIC | Age: 69
End: 2024-10-22

## 2024-10-22 NOTE — PROGRESS NOTES
Transitions of Care Navigation  Discharge Date: 10/18/24  Contact Date: 10/22/2024    Transitions of Care Assessment:  TRI Initial Assessment    General:  Assessment completed with: Patient  Patient Subjective: Spoke with patient.  She reports she is no longer have pain in her fifth digit on her left had.  She only has bruising.  Patient is not taking any current medications. Attempted to schedule appointment with primary care physician but no availability that would fit patient's work schedule.  Telephone encounter sent to primary care physician regarding this. Patient did not have any additional questions or concerns.  Chief Complaint: Contusion of left little finger without damage to nail  Verify patient name and  with patient/ caregiver: Yes    Hospital Stay/Discharge:  Tell me what you understand of why you were in the hospital or emergency department: I hurt my finger  Prior to leaving the hospital were your Discharge Instructions reviewed with you?: Yes  Did you receive a copy of your written Discharge Instructions?: Yes  What questions do you have about your Discharge Instructions?: Patient did not have any additional questions  Do you feel better or worse since you left the hospital or emergency department?: Better    Follow - Up Appointment:  Do you have a follow-up appointment?: No  Are there any barriers to getting to your follow-up appointment?: No    Home Health/DME:  Prior to leaving the hospital was Home Health (HH) arranged for you?: N/A  Are HH needs identified by staff during the assessment?: No     Prior to leaving the hospital or emergency department was Durable Medical Equipment (DME), medical supplies, or infusions arranged for you?: N/A  Are DME/medical supply/infusions needs identified by staff during this assessment?: No     Medications/Diet:  Did any of your medications change, during or after your hospital stay or ED visit?: No  Do you understand what your medications are for and  possible side effects?: Yes  Are there any reasons that keep you from taking your medication as prescribed?: No  Any concerns about medication refills?: No    Were you given a different diet per your Discharge Instructions?: No  Reason: N/A     Questions/Concerns:  Do you have any questions or concerns that have not been discussed?: No       Nursing Interventions:  Reviewed medications. Assessed for pain.     Medications:   Patient is not taking any current medications.     Follow-up Appointments:      Transitional Care Clinic  Was TCC Ordered: No      Primary Care Provider (If no TCC appointment)  Does patient already have a PCP appointment scheduled? No  Nurse Care Manager Attempted to schedule PCP office ER Follow-up appointment with patient   -If no appointment scheduled: No appointments available that fit patient's work schedule     Specialist  Does the patient have any other follow-up appointment(s) need to be scheduled? No       [x]  Patient verbally agrees to additional follow-up calls from Nurse Care Manager    Book By Date: 10/25/24

## 2024-10-22 NOTE — TELEPHONE ENCOUNTER
Spoke to patient for Transitions of Care call today.  Patient does not have an appointment scheduled at this time.  ER Follow-up appointment needed by 10/25/24.  Please advise.    BOOK BY DATE: 10/25/24    Clinical staff:  Please follow-up with patient and try to get them to schedule as patient would greatly benefit from ER Follow-up.  Thank you!

## 2024-10-22 NOTE — TELEPHONE ENCOUNTER
Please offer patient ER follow up appointment. Patient s/p contusion of left little finger. Thank you

## 2024-10-24 NOTE — TELEPHONE ENCOUNTER
Patient returned call to schedule ER follow up, patient is unable to make 10/25 with pcp, scheduled for next available with Dr Landeros.   Future Appointments   Date Time Provider Department Center   10/29/2024  1:00 PM Jony Landeros MD [facility] [place]

## 2024-10-29 ENCOUNTER — OFFICE VISIT (OUTPATIENT)
Dept: INTERNAL MEDICINE CLINIC | Facility: CLINIC | Age: 69
End: 2024-10-29
Payer: MEDICARE

## 2024-10-29 ENCOUNTER — PATIENT OUTREACH (OUTPATIENT)
Dept: CASE MANAGEMENT | Age: 69
End: 2024-10-29

## 2024-10-29 VITALS
WEIGHT: 164 LBS | HEIGHT: 64 IN | HEART RATE: 85 BPM | BODY MASS INDEX: 28 KG/M2 | TEMPERATURE: 97 F | SYSTOLIC BLOOD PRESSURE: 120 MMHG | DIASTOLIC BLOOD PRESSURE: 70 MMHG | OXYGEN SATURATION: 97 %

## 2024-10-29 DIAGNOSIS — S60.052D CONTUSION OF LEFT LITTLE FINGER WITHOUT DAMAGE TO NAIL, SUBSEQUENT ENCOUNTER: Primary | ICD-10-CM

## 2024-10-29 PROCEDURE — 1160F RVW MEDS BY RX/DR IN RCRD: CPT | Performed by: INTERNAL MEDICINE

## 2024-10-29 PROCEDURE — 99213 OFFICE O/P EST LOW 20 MIN: CPT | Performed by: INTERNAL MEDICINE

## 2024-10-29 PROCEDURE — 3008F BODY MASS INDEX DOCD: CPT | Performed by: INTERNAL MEDICINE

## 2024-10-29 PROCEDURE — 3078F DIAST BP <80 MM HG: CPT | Performed by: INTERNAL MEDICINE

## 2024-10-29 PROCEDURE — 3074F SYST BP LT 130 MM HG: CPT | Performed by: INTERNAL MEDICINE

## 2024-10-29 PROCEDURE — 1159F MED LIST DOCD IN RCRD: CPT | Performed by: INTERNAL MEDICINE

## 2024-10-29 RX ORDER — ESTRADIOL 0.1 MG/G
1 CREAM VAGINAL AS NEEDED
COMMUNITY
Start: 2024-10-25

## 2024-10-29 NOTE — PROGRESS NOTES
Subjective:   Calvin Parikh is a 68 year old female who presents for Follow - Up     The patient had conveyor belt injury during the work on 10/18/2024.  The globe got stuck in the conveyor belt and she sustained some bruises in the left little finger.  The patient had x-ray of the hand done and there is no fracture.  The patient is right-handed.    Initially she had swollen left little finger base and surrounding areas.  However currently the swelling has gone down significantly.  But there is some pain when pressing the area.    History/Other:    Chief Complaint Reviewed and Verified  No Further Nursing Notes to   Review  Tobacco Reviewed  Allergies Reviewed  Medications Reviewed    Medical History Reviewed  Surgical History Reviewed  Family History   Reviewed  Social History Reviewed         Tobacco:  She smoked tobacco in the past but quit greater than 12 months ago.  Social History     Tobacco Use   Smoking Status Former    Current packs/day: 0.00    Average packs/day: 2.0 packs/day for 6.0 years (12.0 ttl pk-yrs)    Types: Cigarettes    Start date: 1979    Quit date: 1985    Years since quittin.8   Smokeless Tobacco Never        Current Outpatient Medications   Medication Sig Dispense Refill    estradiol 0.1 MG/GM Vaginal Cream Place 1 g vaginally as needed.           Review of Systems:  Pertinent items are noted in HPI.  A comprehensive review of systems was negative.      Objective:   /70 (BP Location: Right arm, Patient Position: Sitting, Cuff Size: adult)   Pulse 85   Temp 97.3 °F (36.3 °C) (Temporal)   Ht 5' 4\" (1.626 m)   Wt 164 lb (74.4 kg)   SpO2 97%   BMI 28.15 kg/m²  Estimated body mass index is 28.15 kg/m² as calculated from the following:    Height as of this encounter: 5' 4\" (1.626 m).    Weight as of this encounter: 164 lb (74.4 kg).  HEENT: AT/NC  Chest is clear to auscultate  Heart sounds normal  Minimal swelling at the base of left little finger with minimal  tenderness.  Full range of movement.      Assessment & Plan:   There are no diagnoses linked to this encounter.    1.  Contusion at the base of left little finger: Significant improvement.  Patient has resumed work at [employer].    2.  Preventative care: Will take flu and COVID combination shot sometime next month.  up-to-date with mammogram and colonoscopy.    No follow-ups on file.    Jony Landeros MD, 10/29/2024, 1:06 PM

## 2024-10-30 NOTE — PROGRESS NOTES
TRI Follow-up Assessment    General:  Assessment completed with: Patient  Community Resources: Other (n/a)    Progress/Care Plan:  Is the patient progressing as planned?: Yes  Care Plan Update: Seen primary care physician partner yesterday.    1.  Contusion at the base of left little finger: Significant improvement.  Patient has resumed work at [employer].    Spoke with patient. She reports some tenderness on the finger when touched.  Swelling has almost resolved but still has bruising.  She has returned to work.  Patient did not have any additional questions or concerns.  New Care Plan: Continue to monitor for swelling and increased pain  Frequency/Follow Up Plan: 1 week     Notes:  Navigator Notes: bruising, pain, swelling

## 2024-11-06 ENCOUNTER — PATIENT OUTREACH (OUTPATIENT)
Dept: CASE MANAGEMENT | Age: 69
End: 2024-11-06

## 2024-11-07 NOTE — PROGRESS NOTES
TRI Graduation Assessment    General:  Assessment completed with: Patient  Community Resources: Other (u/a)    Care Plan/Instructions:   Care Plan Summary (Recap of navigation period including # of ED & Hospital Admission, and if goals met or unmet): Spoke with patient she reports bruising, swelling and pain has resolved in left hand/finger. She reports she hit the same area yesterday and it was painful but now it has resolved.  Patient has met goals and program will end and this will be last outreach call from nurse care manager. Patient agrees. Primary care physician updated. Navigation period 10/22 - 11/7.  One emergency room visit on 10/18 that initiated TRI.  Goals have been met.  Patient Graduation Instructions (Ongoing barriers to care identified, Areas of Need, Areas of Progress): Barriers:  None  Needs:  None  Progress:  bruising, pain and swelling of left hand/finger resolved

## 2025-02-24 ENCOUNTER — OFFICE VISIT (OUTPATIENT)
Dept: INTERNAL MEDICINE CLINIC | Facility: CLINIC | Age: 70
End: 2025-02-24
Payer: MEDICARE

## 2025-02-24 VITALS
TEMPERATURE: 97 F | BODY MASS INDEX: 28.24 KG/M2 | WEIGHT: 165.38 LBS | SYSTOLIC BLOOD PRESSURE: 108 MMHG | HEIGHT: 64 IN | HEART RATE: 90 BPM | OXYGEN SATURATION: 97 % | RESPIRATION RATE: 16 BRPM | DIASTOLIC BLOOD PRESSURE: 70 MMHG

## 2025-02-24 DIAGNOSIS — F51.04 CHRONIC INSOMNIA: ICD-10-CM

## 2025-02-24 DIAGNOSIS — L29.2 VULVAR PRURITUS: ICD-10-CM

## 2025-02-24 DIAGNOSIS — Z98.890 S/P BILATERAL INGUINAL HERNIA REPAIR: ICD-10-CM

## 2025-02-24 DIAGNOSIS — M81.0 AGE-RELATED OSTEOPOROSIS WITHOUT CURRENT PATHOLOGICAL FRACTURE: ICD-10-CM

## 2025-02-24 DIAGNOSIS — L90.0 LICHEN SCLEROSUS: ICD-10-CM

## 2025-02-24 DIAGNOSIS — R19.5 CHANGE IN CONSISTENCY OF STOOL: ICD-10-CM

## 2025-02-24 DIAGNOSIS — Z00.00 WELLNESS EXAMINATION: Primary | ICD-10-CM

## 2025-02-24 DIAGNOSIS — E55.9 HYPOVITAMINOSIS D: ICD-10-CM

## 2025-02-24 DIAGNOSIS — E78.00 HYPERCHOLESTEROLEMIA: ICD-10-CM

## 2025-02-24 DIAGNOSIS — Z87.19 S/P BILATERAL INGUINAL HERNIA REPAIR: ICD-10-CM

## 2025-02-24 DIAGNOSIS — R73.03 PRE-DIABETES: ICD-10-CM

## 2025-02-24 DIAGNOSIS — Z12.31 SCREENING MAMMOGRAM FOR BREAST CANCER: ICD-10-CM

## 2025-02-24 DIAGNOSIS — Z59.41 FOOD INSECURITY: ICD-10-CM

## 2025-02-24 SDOH — ECONOMIC STABILITY - FOOD INSECURITY: FOOD INSECURITY: Z59.41

## 2025-02-24 NOTE — PROGRESS NOTES
Subjective:   Calvin Parikh is a 69 year old female who presents for a MA AHA (Medicare Advantage Annual Health Assessment) and Subsequent Annual Wellness visit (Pt already had Initial Annual Wellness) and scheduled follow up of multiple significant but stable problems and acute complicated new problem.     Loose stools for the past month  She notes not water but not solid   She notes it is soft. No pellets   She used to take a stool softener but she stopped it   Denies abd pain. Denies blood     She previously was on mounjaro but her insurance no longer was covering   She last took in 2023    She has pre-diabetes     Wt Readings from Last 6 Encounters:   02/24/25 165 lb 6.4 oz (75 kg)   10/29/24 164 lb (74.4 kg)   10/18/24 163 lb (73.9 kg)   05/15/24 163 lb 12.8 oz (74.3 kg)   04/08/24 164 lb (74.4 kg)   04/04/24 156 lb (70.8 kg)      She is having trouble sleeping  She woke up at 3am and then didn't get back to sleep   She gets up 3x/night to urinate      She denies urinary urgency or frequency during the day     She has vaginal itching that is continuing. She has had a biopsy and uses estradiol cream     She takes vit D 2000 units daily. She hasn't been taking them every day   She takes biotin   Apple cider vinegar   Multivitamin       History/Other:   Fall Risk Assessment:   She has been screened for Falls and is low risk.      Cognitive Assessment:   She had a completely normal cognitive assessment - see flowsheet entries     Functional Ability/Status:   Calvin Parikh has a completely normal functional assessment. See flowsheet for details.      Depression Screening (PHQ):  PHQ-2 SCORE: 0  , done 2/24/2025         Advanced Directives:   She does NOT have a Living Will. [Do you have a living will?: No]  She does NOT have a Power of  for Health Care. [Do you have a healthcare power of ?: No]  Not discussed      Patient Active Problem List   Diagnosis    Pre-diabetes    Chronic insomnia     Lichen sclerosus    Hypercholesterolemia    Age-related osteoporosis without current pathological fracture    S/P bilateral inguinal hernia repair    Hypovitaminosis D     Allergies:  She is allergic to hydrocodone and sulfa drugs cross reactors.    Current Medications:  Outpatient Medications Marked as Taking for the 25 encounter (Office Visit) with Maria Del Rosario Razo MD   Medication Sig    estradiol 0.1 MG/GM Vaginal Cream Place 1 g vaginally as needed.       Medical History:  She  has a past medical history of Acute shoulder pain, Blood in the stool, Calculus of kidney, Cataract, Chronic insomnia, Gout, Herniated disc, High cholesterol, Inguinal lymphadenopathy, Left inguinal pain, Neck pain (2021), Night sweats, Obesity (BMI 30-39.9), Pneumonia due to organism (), Prediabetes, Trapezius muscle spasm, Urinary urgency, and Visual impairment.  Surgical History:  She  has a past surgical history that includes excis lumbar disk,one level (2015); Fracture surgery (Right, 2004); ; colonoscopy; and spine surgery procedure unlisted ().   Family History:  Her family history includes Diabetes in her mother; Heart Attack in her mother.  Social History:  She  reports that she quit smoking about 40 years ago. Her smoking use included cigarettes. She started smoking about 46 years ago. She has a 12 pack-year smoking history. She has never used smokeless tobacco. She reports current alcohol use. She reports that she does not use drugs.    Tobacco:  She smoked tobacco in the past but quit greater than 12 months ago.  Social History     Tobacco Use   Smoking Status Former    Current packs/day: 0.00    Average packs/day: 2.0 packs/day for 6.0 years (12.0 ttl pk-yrs)    Types: Cigarettes    Start date: 1979    Quit date: 1985    Years since quittin.1   Smokeless Tobacco Never        CAGE Alcohol Screen:   CAGE screening score of 0 on 2025, showing low risk of alcohol  abuse.      Patient Care Team:  Maria Del Rosario Razo MD as PCP - General (Internal Medicine)  Miguel Spencer MD as Consulting Physician (PULMONARY DISEASES)  Donn Toro MD as Consulting Physician (OBSTETRICS & GYNECOLOGY)  Braxton Man DPM as Consulting Physician (PODIATRIST)  Jaylan Ledezma DO (GASTROENTEROLOGY)  Toya Sawyer MD (OBSTETRICS & GYNECOLOGY)  Rachana Crowley PA-C as Physician Assistant (NEUROLOGY)  Nathan Esposito MD as Consulting Physician (NEUROSURGERY)  Dillon Navarrete, PT as Physical Therapist  Jorje Loco PA as Consulting Physician (Physician Assistant)  Moisés Pappas PT as Physical Therapist    Review of Systems   Constitutional:  Negative for fever.   HENT:  Negative for congestion.    Eyes:  Negative for visual disturbance.   Respiratory:  Negative for shortness of breath.    Cardiovascular:  Negative for chest pain.   Gastrointestinal:  Negative for constipation.   Genitourinary:  Negative for dysuria.   Neurological: Negative.    Hematological: Negative.    Psychiatric/Behavioral:  Positive for sleep disturbance.           Objective:   Physical Exam  Vitals reviewed.   Constitutional:       General: She is not in acute distress.     Appearance: She is well-developed.   HENT:      Head: Normocephalic and atraumatic.   Eyes:      Conjunctiva/sclera: Conjunctivae normal.   Cardiovascular:      Rate and Rhythm: Normal rate and regular rhythm.      Heart sounds: Normal heart sounds.   Pulmonary:      Effort: Pulmonary effort is normal.      Breath sounds: Normal breath sounds.   Musculoskeletal:      Cervical back: Neck supple.      Right lower leg: No edema.      Left lower leg: No edema.   Skin:     General: Skin is warm and dry.   Neurological:      General: No focal deficit present.      Mental Status: She is alert.   Psychiatric:         Mood and Affect: Mood normal.            /70   Pulse 90   Temp 97.3 °F (36.3 °C) (Temporal)   Resp 16   Ht 5' 4\" (1.626  m)   Wt 165 lb 6.4 oz (75 kg)   SpO2 97%   BMI 28.39 kg/m²  Estimated body mass index is 28.39 kg/m² as calculated from the following:    Height as of this encounter: 5' 4\" (1.626 m).    Weight as of this encounter: 165 lb 6.4 oz (75 kg).    Medicare Hearing Assessment:   Hearing Screening    Time taken: 2/24/2025 11:50 AM  Entry User: Sarah Joaquin  Screening Method: Finger Rub  Finger Rub Result: Pass               Assessment & Plan:   Calvin Parikh is a 69 year old female who presents for a Medicare Assessment.     1. Wellness examination (Primary)  -mammo due in 3/2025  2. Screening mammogram for breast cancer  -     [facility]ELIS 2D+3D SCREENING BILAT (CPT=77067/59116); Future; Expected date: 03/20/2025  3. Hypovitaminosis D -chronic, stable. Check vit D   -     Vitamin D; Future; Expected date: 02/24/2025  4. Hypercholesterolemia - chronic, stable. Check labs   -     Comp Metabolic Panel (14); Future; Expected date: 02/24/2025  -     Lipid Panel; Future; Expected date: 02/24/2025  5. Pre-diabetes - chronic, stable. Check A1c.   -     Hemoglobin A1C; Future; Expected date: 02/24/2025  6. Change in consistency of stool - new problem. Recommend seeing gastro for possible colonoscopy since she has had a change in stool consistency and she is due in 6 months for colonoscopy anyway  -     Gastro Referral - In Network  7. Chronic insomnia -chronic, worsened. Discussed sleep hygiene information and otc benadryl or melatonin.   8. Age-related osteoporosis without current pathological fracture - chronic, stable. She had side effects with alendronate and stopped it. Discussed boniva and prolia options for treatment and she is hesitant about a medication she has to take for the rest of her life (prolia). She wants to recheck DEXA next year and re-evaluate. Discussed weight bearing exercise and plenty of calcium in the diet.   9. Lichen sclerosus and  10. Vulvar pruritus - chronic, uncontrolled. She takes vaginal estrogen  which helps; recommend f/u with gyne   11. S/P bilateral inguinal hernia repair - stable  12. Food insecurity - declines further resources     The patient indicates understanding of these issues and agrees to the plan.  Reinforced healthy diet, lifestyle, and exercise.      No follow-ups on file.     Maria Del Rosario Razo MD, 2/24/2025     Supplementary Documentation:   General Health:  In the past six months, have you lost more than 10 pounds without trying?: 2 - No  Has your appetite been poor?: No  Type of Diet: Balanced  How does the patient maintain a good energy level?: Daily Walks  How would you describe your daily physical activity?: Moderate  How would you describe your current health state?: Good  How do you maintain positive mental well-being?: Visiting Friends;Visiting Family  On a scale of 0 to 10, with 0 being no pain and 10 being severe pain, what is your pain level?: 0 - (None)  In the past six months, have you experienced urine leakage?: 0-No  At any time do you feel concerned for the safety/well-being of yourself and/or your children, in your home or elsewhere?: No  Have you had any immunizations at another office such as Influenza, Hepatitis B, Tetanus, or Pneumococcal?: No    Health Maintenance   Topic Date Due    COVID-19 Vaccine (5 - 2024-25 season) 09/01/2024    Annual Well Visit  01/01/2025    Annual Depression Screening  01/01/2025    Fall Risk Screening (Annual)  01/01/2025    Mammogram  03/20/2025    Colorectal Cancer Screening  08/28/2025    Influenza Vaccine  Completed    DEXA Scan  Completed    Pneumococcal Vaccine: 50+ Years  Completed    Zoster Vaccines  Completed    Meningococcal B Vaccine  Aged Out

## 2025-02-24 NOTE — PATIENT INSTRUCTIONS
Blood work     Call to schedule appointment for colonoscopy with gastroenterology     Call to schedule appointment for mammogram for after March 20

## 2025-03-14 ENCOUNTER — LAB ENCOUNTER (OUTPATIENT)
Dept: LAB | Age: 70
End: 2025-03-14
Attending: INTERNAL MEDICINE
Payer: MEDICARE

## 2025-03-14 DIAGNOSIS — E55.9 HYPOVITAMINOSIS D: ICD-10-CM

## 2025-03-14 DIAGNOSIS — E78.00 HYPERCHOLESTEROLEMIA: ICD-10-CM

## 2025-03-14 DIAGNOSIS — R73.03 PRE-DIABETES: ICD-10-CM

## 2025-03-14 LAB
ALBUMIN SERPL-MCNC: 4.6 G/DL (ref 3.2–4.8)
ALBUMIN/GLOB SERPL: 1.6 {RATIO} (ref 1–2)
ALP LIVER SERPL-CCNC: 57 U/L
ALT SERPL-CCNC: 9 U/L
ANION GAP SERPL CALC-SCNC: 10 MMOL/L (ref 0–18)
AST SERPL-CCNC: 18 U/L (ref ?–34)
BILIRUB SERPL-MCNC: 0.4 MG/DL (ref 0.2–1.1)
BUN BLD-MCNC: 9 MG/DL (ref 9–23)
CALCIUM BLD-MCNC: 9.7 MG/DL (ref 8.7–10.6)
CHLORIDE SERPL-SCNC: 104 MMOL/L (ref 98–112)
CHOLEST SERPL-MCNC: 216 MG/DL (ref ?–200)
CO2 SERPL-SCNC: 27 MMOL/L (ref 21–32)
CREAT BLD-MCNC: 0.79 MG/DL
EGFRCR SERPLBLD CKD-EPI 2021: 80 ML/MIN/1.73M2 (ref 60–?)
EST. AVERAGE GLUCOSE BLD GHB EST-MCNC: 128 MG/DL (ref 68–126)
FASTING PATIENT LIPID ANSWER: YES
FASTING STATUS PATIENT QL REPORTED: YES
GLOBULIN PLAS-MCNC: 2.9 G/DL (ref 2–3.5)
GLUCOSE BLD-MCNC: 88 MG/DL (ref 70–99)
HBA1C MFR BLD: 6.1 % (ref ?–5.7)
HDLC SERPL-MCNC: 59 MG/DL (ref 40–59)
LDLC SERPL CALC-MCNC: 128 MG/DL (ref ?–100)
NONHDLC SERPL-MCNC: 157 MG/DL (ref ?–130)
OSMOLALITY SERPL CALC.SUM OF ELEC: 290 MOSM/KG (ref 275–295)
POTASSIUM SERPL-SCNC: 4.1 MMOL/L (ref 3.5–5.1)
PROT SERPL-MCNC: 7.5 G/DL (ref 5.7–8.2)
SODIUM SERPL-SCNC: 141 MMOL/L (ref 136–145)
TRIGL SERPL-MCNC: 164 MG/DL (ref 30–149)
VIT D+METAB SERPL-MCNC: 21.7 NG/ML (ref 30–100)
VLDLC SERPL CALC-MCNC: 30 MG/DL (ref 0–30)

## 2025-03-14 PROCEDURE — 80053 COMPREHEN METABOLIC PANEL: CPT

## 2025-03-14 PROCEDURE — 36415 COLL VENOUS BLD VENIPUNCTURE: CPT

## 2025-03-14 PROCEDURE — 83036 HEMOGLOBIN GLYCOSYLATED A1C: CPT

## 2025-03-14 PROCEDURE — 82306 VITAMIN D 25 HYDROXY: CPT

## 2025-03-14 PROCEDURE — 80061 LIPID PANEL: CPT

## 2025-03-17 RX ORDER — ERGOCALCIFEROL 1.25 MG/1
50000 CAPSULE, LIQUID FILLED ORAL WEEKLY
Qty: 12 CAPSULE | Refills: 0 | Status: SHIPPED | OUTPATIENT
Start: 2025-03-17 | End: 2025-06-15

## 2025-04-02 ENCOUNTER — HOSPITAL ENCOUNTER (OUTPATIENT)
Dept: MAMMOGRAPHY | Facility: HOSPITAL | Age: 70
Discharge: HOME | End: 2025-04-02
Attending: INTERNAL MEDICINE
Payer: MEDICARE

## 2025-04-02 DIAGNOSIS — Z12.31 SCREENING MAMMOGRAM FOR BREAST CANCER: ICD-10-CM

## 2025-04-02 PROCEDURE — 77063 BREAST TOMOSYNTHESIS BI: CPT | Performed by: INTERNAL MEDICINE

## 2025-04-02 PROCEDURE — 77067 SCR MAMMO BI INCL CAD: CPT | Performed by: INTERNAL MEDICINE

## (undated) DIAGNOSIS — L90.0 LICHEN SCLEROSUS: ICD-10-CM

## (undated) DEVICE — COVER WAND RF DETECT

## (undated) DEVICE — ENDOPATH ULTRA VERESS INSUFFLATION NEEDLES WITH LUER LOCK CONNECTORS: Brand: ENDOPATH

## (undated) DEVICE — LIGHT HANDLE

## (undated) DEVICE — DERMABOND CLOSURE 0.7ML TOPICL

## (undated) DEVICE — MEGA SUTURECUT ND: Brand: ENDOWRIST

## (undated) DEVICE — ROBOTIC GENERAL: Brand: MEDLINE INDUSTRIES, INC.

## (undated) DEVICE — TRAY SURESTEP 16 BARDEX UMETR

## (undated) DEVICE — LAPAROVUE VISIBILITY SYSTEM LAPAROSCOPIC SOLUTIONS: Brand: LAPAROVUE

## (undated) DEVICE — SPONGE STICK WITH PVP-I: Brand: KENDALL

## (undated) DEVICE — SUT MONOCRYL 4-0 PS-2 Y496G

## (undated) DEVICE — BLADELESS OBTURATOR: Brand: WECK VISTA

## (undated) DEVICE — STERILE POLYISOPRENE POWDER-FREE SURGICAL GLOVES: Brand: PROTEXIS

## (undated) DEVICE — MONOPOLAR CURVED SCISSORS: Brand: ENDOWRIST

## (undated) DEVICE — CADIERE FORCEPS: Brand: ENDOWRIST

## (undated) DEVICE — COLUMN DRAPE

## (undated) DEVICE — ARM DRAPE

## (undated) DEVICE — CANNULA SEAL

## (undated) DEVICE — TIP COVER ACCESSORY

## (undated) DEVICE — SUTURE VLOC 90 2-0 9" 2145

## (undated) DEVICE — 40580 - THE PINK PAD - ADVANCED TRENDELENBURG POSITIONING KIT: Brand: 40580 - THE PINK PAD - ADVANCED TRENDELENBURG POSITIONING KIT

## (undated) DEVICE — ANCHOR TISSUE RETRIEVAL SYSTEM, BAG SIZE 125 ML, PORT SIZE 8 MM: Brand: ANCHOR TISSUE RETRIEVAL SYSTEM

## (undated) DEVICE — DRAPE,TOP,102X53,STERILE: Brand: MEDLINE

## (undated) NOTE — LETTER
07/11/19        Magalis Harper  [address] [address] [place]      Dear Shailesh Marquez,    Our records indicate that you have outstanding lab work and or testing that was ordered for you and has not yet been completed: Mammogram - Please contact C

## (undated) NOTE — MR AVS SNAPSHOT
[place]  [address]  [address] [facility] [address] [phone]               Thank you for choosing us for your health care visit with Pearl Sethi MD.  We are glad to serve you and happy to provide you with this s your appointment immediately. However, if you are unsure about the requirements for authorization, please wait 5-7 days and then contact your physician's office.  At that time, you will be provided with any authorization numbers or be assured that none are Enjoy your food, but eat less. Fully enjoy your food when eating. Don’t eat while distracted and slow down. Avoid over sized portions. Don’t eat while when you’re bored.      EAT THESE FOODS MORE OFTEN: EAT THESE FOODS LESS OFTEN:   Make half your pl

## (undated) NOTE — Clinical Note
Initial assessment completed with patient. Message sent to office to assist in scheduling.  Patient agrees to additional follow-up calls from nurse care manager.  Thank you!

## (undated) NOTE — Clinical Note
2017    Patient: Modesta Gallegos  : 1955 Visit date: 2017    Dear  Dr. Isaias Burt MD,    Thank you for referring Modesta Gallegos to my practice. Please find my assessment and plan below.            Assessment   Single skin nodule  (primary en

## (undated) NOTE — LETTER
[facility], [address]  [facility] [address]  [phone]          1/26/24    Re: Calvin Parikh    To Whom It May Concern:     Ms. Pairkh is a patient of mine at [facility]. She has been successfully treated with Mounjaro for pre-diabetes and has had improvement in her A1c from 6.1 to 5.7. She has been stable and doing well on medication. Please allow her to continue on this medically necessary medication.     Sincerely,      Maria Del Rosario Razo M.D.

## (undated) NOTE — ED AVS SNAPSHOT
Mac Capps   MRN: [id number]    Department:  [facility] Emergency Department   Date of Visit:  12/31/2017           Disclosure     Insurance plans vary and the physician(s) referred by the ER may not be covered by your plan.  Please contact yo tell this physician (or your personal doctor if your instructions are to return to your personal doctor) about any new or lasting problems. The primary care or specialist physician will see patients referred from the [facility] Emergency Department.  Roc Flores

## (undated) NOTE — LETTER
Laure Cedillo, :1955    CONSENT FOR PROCEDURE/SEDATION    1. I authorize the performance upon Laure Cedillo  the following: Vulvar Biopsy    2.  I authorize Dr. Esther Smith MD (and whomever is designated as the doctor’s assistant), to perform _________________________________________ Date:___________     Physician Signature: _______________________________ Date:___________

## (undated) NOTE — LETTER
23    Patient: Sven Hooker  : 1955 Visit date: 2023    Dear  Octavio Lopes MD    Thank you for referring Sven Hooker to my practice. Please find my assessment and plan below. Assessment   Rash and nonspecific skin eruption  (primary encounter diagnosis)  Inguinal lymphadenopathy  Bilateral irreducible inguinal hernias      Plan     The patient is recovering nicely following robotic repair of bilateral incarcerated inguinal hernias with mesh and lymph node biopsy. Lymph node biopsy reveals reactive lymph nodes. Separate peritoneal nodule shows evidence of fat necrosis of an epiploic appendage. Patient experienced a likely urticarial drug eruption centered on her abdomen and upper arms. Patient is experienced little relief with over-the-counter remedies including topical Benadryl. I have ordered Solu-Medrol Dosepak. I have encouraged the patient to see her primary care physician for further evaluation and possible referral to an allergist for further work-up. The anticipated postoperative recovery was discussed with the patient in detail. Dietary, activity, and exercise recommendations along with restrictions were discussed with the patient during today's visit. Wound care instructions were discussed during today's visit. The patient will return to my attention on an as needed basis. The patient is encouraged to continue seeing the primary care physician for ongoing medical needs. The patient was given ample opportunity to ask questions. The patient's questions were answered in detail and to the patient's satisfaction. The patient voiced understanding of the postoperative care plan.              Sincerely,       Irene Krabbe, MD   CC:   No Recipients

## (undated) NOTE — LETTER
Date & Time: 10/18/2024, 11:07 PM  Patient: Calvin Parikh  Encounter Provider(s):    Xavier Toth MD       To Whom It May Concern:    Calvin Parikh was seen and treated in our department on 10/18/2024. She can return to work on 10/20/2024.    If you have any questions or concerns, please do not hesitate to call.        _____________________________  Physician/APC Signature